# Patient Record
Sex: FEMALE | Race: BLACK OR AFRICAN AMERICAN
[De-identification: names, ages, dates, MRNs, and addresses within clinical notes are randomized per-mention and may not be internally consistent; named-entity substitution may affect disease eponyms.]

---

## 2017-01-11 ENCOUNTER — HOSPITAL ENCOUNTER (EMERGENCY)
Dept: HOSPITAL 35 - ER | Age: 72
Discharge: HOME | End: 2017-01-11
Payer: COMMERCIAL

## 2017-01-11 VITALS — DIASTOLIC BLOOD PRESSURE: 74 MMHG | SYSTOLIC BLOOD PRESSURE: 135 MMHG

## 2017-01-11 VITALS — BODY MASS INDEX: 30.76 KG/M2 | HEIGHT: 67 IN | WEIGHT: 196.01 LBS

## 2017-01-11 DIAGNOSIS — Z88.1: ICD-10-CM

## 2017-01-11 DIAGNOSIS — I10: ICD-10-CM

## 2017-01-11 DIAGNOSIS — Z88.8: ICD-10-CM

## 2017-01-11 DIAGNOSIS — E11.9: ICD-10-CM

## 2017-01-11 DIAGNOSIS — B34.9: Primary | ICD-10-CM

## 2017-01-11 LAB
ALBUMIN SERPL-MCNC: 3.7 G/DL (ref 3.4–5)
ALP SERPL-CCNC: 172 U/L (ref 46–116)
ALT SERPL-CCNC: 36 U/L (ref 30–65)
ANION GAP SERPL CALC-SCNC: 9 MMOL/L (ref 7–16)
AST SERPL-CCNC: 18 U/L (ref 15–37)
BASOPHILS NFR BLD AUTO: 0.9 % (ref 0–2)
BILIRUB SERPL-MCNC: 1.1 MG/DL
BILIRUB UR-MCNC: NEGATIVE MG/DL
BUN SERPL-MCNC: 13 MG/DL (ref 7–18)
CALCIUM SERPL-MCNC: 8.9 MG/DL (ref 8.5–10.1)
CHLORIDE SERPL-SCNC: 101 MMOL/L (ref 98–107)
CO2 SERPL-SCNC: 29 MMOL/L (ref 21–32)
COLOR UR: YELLOW
CREAT SERPL-MCNC: 1.2 MG/DL (ref 0.6–1.3)
EOSINOPHIL NFR BLD: 1.7 % (ref 0–3)
ERYTHROCYTE [DISTWIDTH] IN BLOOD BY AUTOMATED COUNT: 14.8 % (ref 10.5–14.5)
GLUCOSE SERPL-MCNC: 114 MG/DL (ref 70–99)
GRANULOCYTES NFR BLD MANUAL: 75.8 % (ref 36–66)
HCT VFR BLD CALC: 32.2 % (ref 37–47)
HGB BLD-MCNC: 10.7 GM/DL (ref 12–15)
KETONES UR STRIP-MCNC: NEGATIVE MG/DL
LYMPHOCYTES NFR BLD AUTO: 14.5 % (ref 24–44)
MANUAL DIFFERENTIAL PERFORMED BLD QL: NO
MCH RBC QN AUTO: 25.9 PG (ref 26–34)
MCHC RBC AUTO-ENTMCNC: 33.3 % (ref 28–37)
MCV RBC: 77.8 FL (ref 80–100)
MONOCYTES NFR BLD: 7.1 % (ref 1–8)
NEUTROPHILS # BLD: 5.3 THOU/UL (ref 1.4–8.2)
PLATELET # BLD: 213 THOU/UL (ref 150–400)
POTASSIUM SERPL-SCNC: 3.5 MMOL/L (ref 3.5–5.1)
PROT SERPL-MCNC: 7.8 G/DL (ref 6.4–8.2)
RBC # BLD AUTO: 4.14 MIL/UL (ref 4.2–5)
RBC # UR STRIP: NEGATIVE /UL
SODIUM SERPL-SCNC: 139 MMOL/L (ref 136–145)
SP GR UR STRIP: <= 1.005 (ref 1–1.03)
URINE GLUCOSE-RANDOM*: NEGATIVE
URINE LEUKOCYTES-REFLEX: (no result)
URINE PROTEIN (DIPSTICK): NEGATIVE
URINE WBC-REFLEX: (no result) /HPF (ref 0–5)
UROBILINOGEN UR STRIP-ACNC: 0.2 E.U./DL (ref 0.2–1)
WBC # BLD AUTO: 7 THOU/UL (ref 4–11)

## 2017-07-28 ENCOUNTER — HOSPITAL ENCOUNTER (EMERGENCY)
Dept: HOSPITAL 35 - ER | Age: 72
Discharge: HOME | End: 2017-07-28
Payer: COMMERCIAL

## 2017-07-28 VITALS — SYSTOLIC BLOOD PRESSURE: 164 MMHG | DIASTOLIC BLOOD PRESSURE: 74 MMHG

## 2017-07-28 VITALS — WEIGHT: 189 LBS | BODY MASS INDEX: 29.66 KG/M2 | HEIGHT: 67 IN

## 2017-07-28 DIAGNOSIS — D86.9: ICD-10-CM

## 2017-07-28 DIAGNOSIS — Z88.8: ICD-10-CM

## 2017-07-28 DIAGNOSIS — I10: ICD-10-CM

## 2017-07-28 DIAGNOSIS — K21.9: Primary | ICD-10-CM

## 2017-07-28 DIAGNOSIS — E11.9: ICD-10-CM

## 2017-07-28 DIAGNOSIS — Z88.1: ICD-10-CM

## 2017-07-28 DIAGNOSIS — Z79.4: ICD-10-CM

## 2017-07-28 LAB
ANION GAP SERPL CALC-SCNC: 10 MMOL/L (ref 7–16)
BASOPHILS NFR BLD AUTO: 0.6 % (ref 0–2)
BUN SERPL-MCNC: 13 MG/DL (ref 7–18)
CALCIUM SERPL-MCNC: 9 MG/DL (ref 8.5–10.1)
CHLORIDE SERPL-SCNC: 107 MMOL/L (ref 98–107)
CO2 SERPL-SCNC: 28 MMOL/L (ref 21–32)
CREAT SERPL-MCNC: 1.3 MG/DL (ref 0.6–1)
EOSINOPHIL NFR BLD: 3.9 % (ref 0–3)
ERYTHROCYTE [DISTWIDTH] IN BLOOD BY AUTOMATED COUNT: 14.7 % (ref 10.5–14.5)
GLUCOSE SERPL-MCNC: 117 MG/DL (ref 74–106)
GRANULOCYTES NFR BLD MANUAL: 51.9 % (ref 36–66)
HCT VFR BLD CALC: 29.9 % (ref 37–47)
HGB BLD-MCNC: 10 GM/DL (ref 12–15)
LYMPHOCYTES NFR BLD AUTO: 35.8 % (ref 24–44)
MANUAL DIFFERENTIAL PERFORMED BLD QL: NO
MCH RBC QN AUTO: 26.6 PG (ref 26–34)
MCHC RBC AUTO-ENTMCNC: 33.6 G/DL (ref 28–37)
MCV RBC: 79.2 FL (ref 80–100)
MONOCYTES NFR BLD: 7.8 % (ref 1–8)
NEUTROPHILS # BLD: 3.2 THOU/UL (ref 1.4–8.2)
PLATELET # BLD: 243 THOU/UL (ref 150–400)
POTASSIUM SERPL-SCNC: 3.5 MMOL/L (ref 3.5–5.1)
RBC # BLD AUTO: 3.77 MIL/UL (ref 4.2–5)
SODIUM SERPL-SCNC: 145 MMOL/L (ref 136–145)
TROPONIN I SERPL-MCNC: < 0.04 NG/ML
WBC # BLD AUTO: 6.1 THOU/UL (ref 4–11)

## 2018-01-25 ENCOUNTER — HOSPITAL ENCOUNTER (EMERGENCY)
Dept: HOSPITAL 35 - ER | Age: 73
LOS: 1 days | Discharge: HOME | End: 2018-01-26
Payer: COMMERCIAL

## 2018-01-25 VITALS — WEIGHT: 189.99 LBS | BODY MASS INDEX: 29.82 KG/M2 | HEIGHT: 67 IN

## 2018-01-25 DIAGNOSIS — I10: ICD-10-CM

## 2018-01-25 DIAGNOSIS — Z88.1: ICD-10-CM

## 2018-01-25 DIAGNOSIS — J11.1: Primary | ICD-10-CM

## 2018-01-25 DIAGNOSIS — E11.9: ICD-10-CM

## 2018-01-25 LAB
ANION GAP SERPL CALC-SCNC: 8 MMOL/L (ref 7–16)
BASOPHILS NFR BLD AUTO: 0.8 % (ref 0–2)
BUN SERPL-MCNC: 11 MG/DL (ref 7–18)
CALCIUM SERPL-MCNC: 8.6 MG/DL (ref 8.5–10.1)
CHLORIDE SERPL-SCNC: 103 MMOL/L (ref 98–107)
CO2 SERPL-SCNC: 28 MMOL/L (ref 21–32)
CREAT SERPL-MCNC: 1.2 MG/DL (ref 0.6–1)
EOSINOPHIL NFR BLD: 1 % (ref 0–3)
ERYTHROCYTE [DISTWIDTH] IN BLOOD BY AUTOMATED COUNT: 15.1 % (ref 10.5–14.5)
GLUCOSE SERPL-MCNC: 123 MG/DL (ref 74–106)
GRANULOCYTES NFR BLD MANUAL: 78 % (ref 36–66)
HCT VFR BLD CALC: 31.2 % (ref 37–47)
HGB BLD-MCNC: 10.4 GM/DL (ref 12–15)
LYMPHOCYTES NFR BLD AUTO: 13.4 % (ref 24–44)
MCH RBC QN AUTO: 26.2 PG (ref 26–34)
MCHC RBC AUTO-ENTMCNC: 33.4 G/DL (ref 28–37)
MCV RBC: 78.6 FL (ref 80–100)
MONOCYTES NFR BLD: 6.8 % (ref 1–8)
NEUTROPHILS # BLD: 8.4 THOU/UL (ref 1.4–8.2)
PLATELET # BLD: 222 THOU/UL (ref 150–400)
POTASSIUM SERPL-SCNC: 3.5 MMOL/L (ref 3.5–5.1)
RBC # BLD AUTO: 3.97 MIL/UL (ref 4.2–5)
SODIUM SERPL-SCNC: 139 MMOL/L (ref 136–145)
WBC # BLD AUTO: 10.8 THOU/UL (ref 4–11)

## 2018-01-26 VITALS — DIASTOLIC BLOOD PRESSURE: 67 MMHG | SYSTOLIC BLOOD PRESSURE: 146 MMHG

## 2018-05-13 ENCOUNTER — HOSPITAL ENCOUNTER (EMERGENCY)
Dept: HOSPITAL 35 - ER | Age: 73
Discharge: HOME | End: 2018-05-13
Payer: COMMERCIAL

## 2018-05-13 VITALS — DIASTOLIC BLOOD PRESSURE: 60 MMHG | SYSTOLIC BLOOD PRESSURE: 161 MMHG

## 2018-05-13 VITALS — WEIGHT: 187 LBS | HEIGHT: 67 IN | BODY MASS INDEX: 29.35 KG/M2

## 2018-05-13 DIAGNOSIS — Z79.4: ICD-10-CM

## 2018-05-13 DIAGNOSIS — E11.9: ICD-10-CM

## 2018-05-13 DIAGNOSIS — Z88.8: ICD-10-CM

## 2018-05-13 DIAGNOSIS — I10: ICD-10-CM

## 2018-05-13 DIAGNOSIS — D86.9: ICD-10-CM

## 2018-05-13 DIAGNOSIS — J02.8: Primary | ICD-10-CM

## 2018-05-13 DIAGNOSIS — Z88.1: ICD-10-CM

## 2018-05-20 ENCOUNTER — HOSPITAL ENCOUNTER (EMERGENCY)
Dept: HOSPITAL 35 - ER | Age: 73
Discharge: HOME | End: 2018-05-20
Payer: COMMERCIAL

## 2018-05-20 VITALS — DIASTOLIC BLOOD PRESSURE: 80 MMHG | SYSTOLIC BLOOD PRESSURE: 152 MMHG

## 2018-05-20 VITALS — HEIGHT: 68 IN | WEIGHT: 196.01 LBS | BODY MASS INDEX: 29.71 KG/M2

## 2018-05-20 DIAGNOSIS — I10: ICD-10-CM

## 2018-05-20 DIAGNOSIS — Z88.8: ICD-10-CM

## 2018-05-20 DIAGNOSIS — B34.9: Primary | ICD-10-CM

## 2018-05-20 DIAGNOSIS — Z88.1: ICD-10-CM

## 2018-05-20 DIAGNOSIS — E11.9: ICD-10-CM

## 2018-05-20 LAB
ANION GAP SERPL CALC-SCNC: 7 MMOL/L (ref 7–16)
ANISOCYTOSIS BLD QL SMEAR: SLIGHT
BUN SERPL-MCNC: 16 MG/DL (ref 7–18)
CALCIUM SERPL-MCNC: 9 MG/DL (ref 8.5–10.1)
CHLORIDE SERPL-SCNC: 103 MMOL/L (ref 98–107)
CO2 SERPL-SCNC: 27 MMOL/L (ref 21–32)
CREAT SERPL-MCNC: 1.2 MG/DL (ref 0.6–1)
EOSINOPHIL NFR BLD: 5 % (ref 0–3)
ERYTHROCYTE [DISTWIDTH] IN BLOOD BY AUTOMATED COUNT: 15.5 % (ref 10.5–14.5)
GLUCOSE SERPL-MCNC: 110 MG/DL (ref 74–106)
GRANULOCYTES NFR BLD MANUAL: 73 % (ref 36–66)
HCT VFR BLD CALC: 29.8 % (ref 37–47)
HGB BLD-MCNC: 9.9 GM/DL (ref 12–15)
LYMPHOCYTES NFR BLD AUTO: 17 % (ref 24–44)
MCH RBC QN AUTO: 25.7 PG (ref 26–34)
MCHC RBC AUTO-ENTMCNC: 33.1 G/DL (ref 28–37)
MCV RBC: 77.7 FL (ref 80–100)
MONOCYTES NFR BLD: 3 % (ref 1–8)
NEUTROPHILS # BLD: 9 THOU/UL (ref 1.4–8.2)
NEUTS BAND NFR BLD: 2 % (ref 0–8)
PLATELET # BLD: 262 THOU/UL (ref 150–400)
POTASSIUM SERPL-SCNC: 4 MMOL/L (ref 3.5–5.1)
RBC # BLD AUTO: 3.83 MIL/UL (ref 4.2–5)
SODIUM SERPL-SCNC: 137 MMOL/L (ref 136–145)
TROPONIN I SERPL-MCNC: < 0.04 NG/ML (ref ?–0.06)
WBC # BLD AUTO: 12 THOU/UL (ref 4–11)

## 2018-06-27 ENCOUNTER — HOSPITAL ENCOUNTER (EMERGENCY)
Dept: HOSPITAL 35 - ER | Age: 73
Discharge: HOME | End: 2018-06-27
Payer: COMMERCIAL

## 2018-06-27 VITALS — SYSTOLIC BLOOD PRESSURE: 166 MMHG | DIASTOLIC BLOOD PRESSURE: 78 MMHG

## 2018-06-27 VITALS — BODY MASS INDEX: 29.04 KG/M2 | HEIGHT: 67 IN | WEIGHT: 185.01 LBS
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
## 2018-06-27 DIAGNOSIS — E11.9: ICD-10-CM
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
## 2018-06-27 DIAGNOSIS — Z88.1: ICD-10-CM
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
## 2018-06-27 DIAGNOSIS — I10: ICD-10-CM

## 2018-06-27 DIAGNOSIS — S80.02XA: Primary | ICD-10-CM

## 2019-02-17 ENCOUNTER — HOSPITAL ENCOUNTER (EMERGENCY)
Dept: HOSPITAL 35 - ER | Age: 74
Discharge: HOME | End: 2019-02-17
Payer: COMMERCIAL

## 2019-02-17 VITALS — SYSTOLIC BLOOD PRESSURE: 162 MMHG | DIASTOLIC BLOOD PRESSURE: 76 MMHG

## 2019-02-17 VITALS — BODY MASS INDEX: 29.73 KG/M2 | HEIGHT: 66 IN | WEIGHT: 185.01 LBS

## 2019-02-17 DIAGNOSIS — Z88.8: ICD-10-CM

## 2019-02-17 DIAGNOSIS — D86.9: ICD-10-CM

## 2019-02-17 DIAGNOSIS — N18.9: ICD-10-CM

## 2019-02-17 DIAGNOSIS — I12.9: ICD-10-CM

## 2019-02-17 DIAGNOSIS — E11.22: ICD-10-CM

## 2019-02-17 DIAGNOSIS — Z88.1: ICD-10-CM

## 2019-02-17 DIAGNOSIS — F41.9: ICD-10-CM

## 2019-02-17 DIAGNOSIS — M25.562: ICD-10-CM

## 2019-02-17 DIAGNOSIS — Z79.4: ICD-10-CM

## 2019-02-17 DIAGNOSIS — R55: Primary | ICD-10-CM

## 2019-02-17 LAB
ALBUMIN SERPL-MCNC: 3.7 G/DL (ref 3.4–5)
ALT SERPL-CCNC: 24 U/L (ref 30–65)
ANION GAP SERPL CALC-SCNC: 9 MMOL/L (ref 7–16)
AST SERPL-CCNC: 18 U/L (ref 15–37)
BACTERIA #/AREA URNS HPF: (no result) /HPF
BASOPHILS NFR BLD AUTO: 0.9 % (ref 0–2)
BILIRUB SERPL-MCNC: 0.5 MG/DL
BILIRUB UR-MCNC: NEGATIVE MG/DL
BUN SERPL-MCNC: 15 MG/DL (ref 7–18)
CALCIUM SERPL-MCNC: 9.3 MG/DL (ref 8.5–10.1)
CHLORIDE SERPL-SCNC: 105 MMOL/L (ref 98–107)
CO2 SERPL-SCNC: 28 MMOL/L (ref 21–32)
COLOR UR: YELLOW
CREAT SERPL-MCNC: 1.6 MG/DL (ref 0.6–1)
EOSINOPHIL NFR BLD: 4.7 % (ref 0–3)
ERYTHROCYTE [DISTWIDTH] IN BLOOD BY AUTOMATED COUNT: 15.1 % (ref 10.5–14.5)
GLUCOSE SERPL-MCNC: 153 MG/DL (ref 74–106)
GRANULOCYTES NFR BLD MANUAL: 51.3 % (ref 36–66)
HCT VFR BLD CALC: 33.3 % (ref 37–47)
HGB BLD-MCNC: 11.1 GM/DL (ref 12–15)
KETONES UR STRIP-MCNC: NEGATIVE MG/DL
LYMPHOCYTES NFR BLD AUTO: 37 % (ref 24–44)
MAGNESIUM SERPL-MCNC: 1.9 MG/DL (ref 1.8–2.4)
MCH RBC QN AUTO: 26.4 PG (ref 26–34)
MCHC RBC AUTO-ENTMCNC: 33.4 G/DL (ref 28–37)
MCV RBC: 79.2 FL (ref 80–100)
MONOCYTES NFR BLD: 6.1 % (ref 1–8)
NEUTROPHILS # BLD: 3.2 THOU/UL (ref 1.4–8.2)
NITRITE UR QL STRIP: NEGATIVE
PLATELET # BLD: 255 THOU/UL (ref 150–400)
POTASSIUM SERPL-SCNC: 3.5 MMOL/L (ref 3.5–5.1)
PROT SERPL-MCNC: 7.8 G/DL (ref 6.4–8.2)
RBC # BLD AUTO: 4.2 MIL/UL (ref 4.2–5)
RBC # UR STRIP: NEGATIVE /UL
SODIUM SERPL-SCNC: 142 MMOL/L (ref 136–145)
SP GR UR STRIP: 1.01 (ref 1–1.03)
SQUAMOUS: (no result) /LPF (ref 0–3)
TROPONIN I SERPL-MCNC: <0.06 NG/ML (ref ?–0.06)
URINE CLARITY: CLEAR
URINE GLUCOSE-RANDOM*: NEGATIVE
URINE LEUKOCYTES: (no result)
URINE PROTEIN (DIPSTICK): NEGATIVE
UROBILINOGEN UR STRIP-ACNC: 0.2 E.U./DL (ref 0.2–1)
WBC # BLD AUTO: 6.3 THOU/UL (ref 4–11)
WBC #/AREA URNS HPF: (no result) /HPF (ref 0–5)

## 2019-03-03 ENCOUNTER — HOSPITAL ENCOUNTER (EMERGENCY)
Dept: HOSPITAL 35 - ER | Age: 74
Discharge: HOME | End: 2019-03-03
Payer: COMMERCIAL

## 2019-03-03 VITALS — BODY MASS INDEX: 30.76 KG/M2 | WEIGHT: 196.01 LBS | HEIGHT: 67 IN

## 2019-03-03 VITALS — SYSTOLIC BLOOD PRESSURE: 187 MMHG | DIASTOLIC BLOOD PRESSURE: 79 MMHG

## 2019-03-03 DIAGNOSIS — R51: Primary | ICD-10-CM

## 2019-03-03 DIAGNOSIS — I10: ICD-10-CM

## 2019-03-03 DIAGNOSIS — Z88.1: ICD-10-CM

## 2019-03-03 DIAGNOSIS — E11.9: ICD-10-CM

## 2019-03-03 DIAGNOSIS — Z88.8: ICD-10-CM

## 2019-07-25 ENCOUNTER — HOSPITAL ENCOUNTER (EMERGENCY)
Dept: HOSPITAL 35 - ER | Age: 74
Discharge: HOME | End: 2019-07-25
Payer: COMMERCIAL

## 2019-07-25 VITALS — BODY MASS INDEX: 31.24 KG/M2 | HEIGHT: 67 IN | WEIGHT: 199.01 LBS

## 2019-07-25 VITALS — DIASTOLIC BLOOD PRESSURE: 71 MMHG | SYSTOLIC BLOOD PRESSURE: 156 MMHG

## 2019-07-25 DIAGNOSIS — R09.81: ICD-10-CM

## 2019-07-25 DIAGNOSIS — I10: ICD-10-CM

## 2019-07-25 DIAGNOSIS — E11.9: ICD-10-CM

## 2019-07-25 DIAGNOSIS — Z79.4: ICD-10-CM

## 2019-07-25 DIAGNOSIS — M27.0: Primary | ICD-10-CM

## 2019-07-25 DIAGNOSIS — Z88.1: ICD-10-CM

## 2019-07-25 DIAGNOSIS — Z88.8: ICD-10-CM

## 2020-08-13 ENCOUNTER — HOSPITAL ENCOUNTER (EMERGENCY)
Dept: HOSPITAL 35 - ER | Age: 75
Discharge: HOME | End: 2020-08-13
Payer: COMMERCIAL

## 2020-08-13 VITALS — DIASTOLIC BLOOD PRESSURE: 68 MMHG | SYSTOLIC BLOOD PRESSURE: 148 MMHG

## 2020-08-13 VITALS — WEIGHT: 178.99 LBS | BODY MASS INDEX: 28.09 KG/M2 | HEIGHT: 67 IN

## 2020-08-13 DIAGNOSIS — E11.9: ICD-10-CM

## 2020-08-13 DIAGNOSIS — R07.9: Primary | ICD-10-CM

## 2020-08-13 DIAGNOSIS — I10: ICD-10-CM

## 2020-08-13 DIAGNOSIS — Z79.899: ICD-10-CM

## 2020-08-13 DIAGNOSIS — R53.83: ICD-10-CM

## 2020-08-13 DIAGNOSIS — Z79.4: ICD-10-CM

## 2020-08-13 DIAGNOSIS — Z98.890: ICD-10-CM

## 2020-08-13 DIAGNOSIS — Z88.1: ICD-10-CM

## 2020-08-13 LAB
ANION GAP SERPL CALC-SCNC: 10 MMOL/L (ref 7–16)
ANISOCYTOSIS BLD QL SMEAR: SLIGHT
BASOPHILS NFR BLD AUTO: 0 % (ref 0–2)
BUN SERPL-MCNC: 21 MG/DL (ref 7–18)
CALCIUM SERPL-MCNC: 8.6 MG/DL (ref 8.5–10.1)
CHLORIDE SERPL-SCNC: 106 MMOL/L (ref 98–107)
CO2 SERPL-SCNC: 27 MMOL/L (ref 21–32)
CREAT SERPL-MCNC: 1.3 MG/DL (ref 0.6–1)
EOSINOPHIL NFR BLD: 2 % (ref 0–3)
ERYTHROCYTE [DISTWIDTH] IN BLOOD BY AUTOMATED COUNT: 15.6 % (ref 10.5–14.5)
GLUCOSE SERPL-MCNC: 95 MG/DL (ref 74–106)
GRANULOCYTES NFR BLD MANUAL: 50 % (ref 36–66)
HCT VFR BLD CALC: 32.5 % (ref 37–47)
HGB BLD-MCNC: 10.5 GM/DL (ref 12–15)
LYMPHOCYTES NFR BLD AUTO: 42 % (ref 24–44)
MCH RBC QN AUTO: 25.9 PG (ref 26–34)
MCHC RBC AUTO-ENTMCNC: 32.5 G/DL (ref 28–37)
MCV RBC: 79.9 FL (ref 80–100)
MONOCYTES NFR BLD: 6 % (ref 1–8)
NEUTROPHILS # BLD: 3.1 THOU/UL (ref 1.4–8.2)
NEUTS BAND NFR BLD: 0 % (ref 0–8)
PLATELET # BLD: 308 THOU/UL (ref 150–400)
POTASSIUM SERPL-SCNC: 3.8 MMOL/L (ref 3.5–5.1)
RBC # BLD AUTO: 4.06 MIL/UL (ref 4.2–5)
SODIUM SERPL-SCNC: 143 MMOL/L (ref 136–145)
TROPONIN I SERPL-MCNC: <0.06 NG/ML (ref ?–0.06)
WBC # BLD AUTO: 6.2 THOU/UL (ref 4–11)

## 2020-08-13 NOTE — EKG
[facility]
[place]   [address]                     ELECTROCARDIOGRAM REPORT      
_______________________________________________________________________________
 
Name:       DEEPTHI JEWELL               Room #:                     DEP [facility]#:      [id number]                       Account #:      [id number]  
Admission:  20    Attend Phys:                          
Discharge:  20    Date of Birth:  [birth year]  
                                                          Report #: [id number]
                                                                    [id number]
_______________________________________________________________________________
THIS REPORT FOR:  
 
cc:  Jeanine Tam MD, Stany A. MD Lundgren,Kain SANTIAGO MD [facility]                                        ~
THIS REPORT FOR:   //name//                          
 
                         [facility] ED
                                       
Test Date:    2020               Test Time:    06:18:16
Pat Name:     DEEPTHI JEWELL            Department:   
Patient ID:   [id number]            Room:          
Gender:       F                        Technician:   NOE
:          1945               Requested By: Konstantin Mccartney
Order Number: [id number] MD:   Kain Carballo
                                 Measurements
Intervals                              Axis          
Rate:         59                       P:            -45
HI:           235                      QRS:          -41
QRSD:         125                      T:            1
QT:           424                                    
QTc:          420                                    
                           Interpretive Statements
Sinus bradycardia
Prolonged HI interval
Nonspecific IVCD with LAD
Left ventricular hypertrophy
Baseline wander in lead(s) I,II,aVR
Compared to ECG 2019 15:27:21
No significant change was found
Electronically Signed On 2020 9:12:33 CDT by Kain Carballo
[email or web address]
 
 
 
 
 
 
 
 
 
 
 
 
  <ELECTRONICALLY SIGNED>
   By: Kain Carballo MD, [facility]   
  20
D: 20                           _____________________________________
T: 20                           Kain Carballo MD, FAC     /EPI

## 2021-01-11 ENCOUNTER — HOSPITAL ENCOUNTER (EMERGENCY)
Dept: HOSPITAL 35 - ER | Age: 76
Discharge: HOME | End: 2021-01-11
Payer: COMMERCIAL

## 2021-01-11 VITALS — DIASTOLIC BLOOD PRESSURE: 75 MMHG | SYSTOLIC BLOOD PRESSURE: 124 MMHG

## 2021-01-11 VITALS — BODY MASS INDEX: 27.78 KG/M2 | HEIGHT: 67 IN | WEIGHT: 177.01 LBS

## 2021-01-11 DIAGNOSIS — E11.9: ICD-10-CM

## 2021-01-11 DIAGNOSIS — Z79.899: ICD-10-CM

## 2021-01-11 DIAGNOSIS — Z88.1: ICD-10-CM

## 2021-01-11 DIAGNOSIS — Z88.8: ICD-10-CM

## 2021-01-11 DIAGNOSIS — Z98.890: ICD-10-CM

## 2021-01-11 DIAGNOSIS — Z79.4: ICD-10-CM

## 2021-01-11 DIAGNOSIS — U07.1: Primary | ICD-10-CM

## 2021-01-11 DIAGNOSIS — I10: ICD-10-CM

## 2021-01-11 LAB
ALBUMIN SERPL-MCNC: 3.3 G/DL (ref 3.4–5)
ALT SERPL-CCNC: 27 U/L (ref 30–65)
ANION GAP SERPL CALC-SCNC: 12 MMOL/L (ref 7–16)
APTT BLD: 25.5 SECONDS (ref 24.5–32.8)
AST SERPL-CCNC: 23 U/L (ref 15–37)
BACTERIA-REFLEX: (no result) /HPF
BASOPHILS NFR BLD AUTO: 1.3 % (ref 0–2)
BILIRUB SERPL-MCNC: 0.6 MG/DL (ref 0.2–1)
BILIRUB UR-MCNC: NEGATIVE MG/DL
BUN SERPL-MCNC: 10 MG/DL (ref 7–18)
CALCIUM SERPL-MCNC: 8.8 MG/DL (ref 8.5–10.1)
CHLORIDE SERPL-SCNC: 104 MMOL/L (ref 98–107)
CO2 SERPL-SCNC: 26 MMOL/L (ref 21–32)
COLOR UR: YELLOW
CREAT SERPL-MCNC: 1.2 MG/DL (ref 0.6–1)
EOSINOPHIL NFR BLD: 1 % (ref 0–3)
ERYTHROCYTE [DISTWIDTH] IN BLOOD BY AUTOMATED COUNT: 15.2 % (ref 10.5–14.5)
GLUCOSE SERPL-MCNC: 109 MG/DL (ref 74–106)
GRANULOCYTES NFR BLD MANUAL: 60.5 % (ref 36–66)
HCT VFR BLD CALC: 31.7 % (ref 37–47)
HGB BLD-MCNC: 10.1 GM/DL (ref 12–15)
INR PPP: 1
KETONES UR STRIP-MCNC: NEGATIVE MG/DL
LYMPHOCYTES NFR BLD AUTO: 27.6 % (ref 24–44)
MCH RBC QN AUTO: 25.8 PG (ref 26–34)
MCHC RBC AUTO-ENTMCNC: 31.9 G/DL (ref 28–37)
MCV RBC: 80.9 FL (ref 80–100)
MONOCYTES NFR BLD: 9.6 % (ref 1–8)
NEUTROPHILS # BLD: 2.3 THOU/UL (ref 1.4–8.2)
PLATELET # BLD: 198 THOU/UL (ref 150–400)
POTASSIUM SERPL-SCNC: 3.6 MMOL/L (ref 3.5–5.1)
PROT SERPL-MCNC: 7.4 G/DL (ref 6.4–8.2)
PROTHROMBIN TIME: 10.6 SECONDS (ref 9.3–11.4)
RBC # BLD AUTO: 3.92 MIL/UL (ref 4.2–5)
RBC # UR STRIP: NEGATIVE /UL
SODIUM SERPL-SCNC: 142 MMOL/L (ref 136–145)
SP GR UR STRIP: 1.01 (ref 1–1.03)
SQUAMOUS: (no result) /LPF (ref 0–3)
TROPONIN I SERPL-MCNC: <0.06 NG/ML (ref ?–0.06)
URINE CLARITY: CLEAR
URINE GLUCOSE-RANDOM*: NEGATIVE
URINE LEUKOCYTES-REFLEX: NEGATIVE
URINE NITRITE-REFLEX: NEGATIVE
URINE PROTEIN (DIPSTICK): (no result)
UROBILINOGEN UR STRIP-ACNC: 0.2 E.U./DL (ref 0.2–1)
WBC # BLD AUTO: 3.7 THOU/UL (ref 4–11)

## 2021-01-11 NOTE — EKG
[address] [address] [facility] [address]
[place]  [address]
Phone:  [phone]                    ELECTROCARDIOGRAM REPORT      
_______________________________________________________________________________
 
Name:       DEEPTHI JEWELL               Room #:                     PRE ER  
M.R.#:      [id number]     Account #:      [id number]  
Admission:              Attend Phys:                          
Discharge:              Date of Birth:  [birth year]  
                                                          Report #: [id number]
   [id number]
_______________________________________________________________________________
                         [facility] ED
                                       
Test Date:    2021               Test Time:    16:02:11
Pat Name:     DEEPTHI JEWELL            Department:   
Patient ID:   [id number]            Room:          
Gender:       F                        Technician:   ARCHANA
:          1945               Requested By: Bright Cook
Order Number: [id number] MD:   Kain Carballo
                                 Measurements
Intervals                              Axis          
Rate:         80                       P:            -45
ND:           205                      QRS:          -30
QRSD:         118                      T:            49
QT:           402                                    
QTc:          464                                    
                           Interpretive Statements
Sinus rhythm with first-degree AV block
LAFB
Nonspecific intraventricular conduction delay
Nonspecific ST segment abnormality
Compared to ECG 2020 06:18:16
No significant change was found
Electronically Signed On 2021 16:22:02 CST by Kain Carballo
[email or web address]
 
 
 
 
 
 
 
 
 
 
 
 
 
 
 
 
 
 
 
  <ELECTRONICALLY SIGNED>
   By: Kain Carballo MD, [facility]   
  21     1622
D: 21 1602                           _____________________________________
T: 21 1602                           Kain Carballo MD, FACC     /EPI

## 2021-01-13 ENCOUNTER — HOSPITAL ENCOUNTER (EMERGENCY)
Dept: HOSPITAL 35 - ER | Age: 76
Discharge: HOME | End: 2021-01-13
Payer: COMMERCIAL

## 2021-01-13 VITALS — SYSTOLIC BLOOD PRESSURE: 148 MMHG | DIASTOLIC BLOOD PRESSURE: 65 MMHG

## 2021-01-13 VITALS — HEIGHT: 67 IN | WEIGHT: 177.01 LBS | BODY MASS INDEX: 27.78 KG/M2

## 2021-01-13 DIAGNOSIS — Z98.890: ICD-10-CM

## 2021-01-13 DIAGNOSIS — Z88.8: ICD-10-CM

## 2021-01-13 DIAGNOSIS — I10: ICD-10-CM

## 2021-01-13 DIAGNOSIS — Z79.4: ICD-10-CM

## 2021-01-13 DIAGNOSIS — E11.9: ICD-10-CM

## 2021-01-13 DIAGNOSIS — Z88.1: ICD-10-CM

## 2021-01-13 DIAGNOSIS — Z79.899: ICD-10-CM

## 2021-01-13 DIAGNOSIS — R10.31: Primary | ICD-10-CM

## 2021-01-13 LAB
ALBUMIN SERPL-MCNC: 3.2 G/DL (ref 3.4–5)
ALT SERPL-CCNC: 25 U/L (ref 14–59)
ANION GAP SERPL CALC-SCNC: 12 MMOL/L (ref 7–16)
AST SERPL-CCNC: 24 U/L (ref 15–37)
BASOPHILS NFR BLD AUTO: 0.7 % (ref 0–2)
BILIRUB SERPL-MCNC: 0.6 MG/DL (ref 0.2–1)
BILIRUB UR-MCNC: NEGATIVE MG/DL
BUN SERPL-MCNC: 10 MG/DL (ref 7–18)
CALCIUM SERPL-MCNC: 8.9 MG/DL (ref 8.5–10.1)
CHLORIDE SERPL-SCNC: 103 MMOL/L (ref 98–107)
CO2 SERPL-SCNC: 24 MMOL/L (ref 21–32)
COLOR UR: YELLOW
CREAT SERPL-MCNC: 1.2 MG/DL (ref 0.6–1)
EOSINOPHIL NFR BLD: 0.8 % (ref 0–3)
ERYTHROCYTE [DISTWIDTH] IN BLOOD BY AUTOMATED COUNT: 14.9 % (ref 10.5–14.5)
GLUCOSE SERPL-MCNC: 117 MG/DL (ref 74–106)
GRANULOCYTES NFR BLD MANUAL: 69.5 % (ref 36–66)
HCT VFR BLD CALC: 31.1 % (ref 37–47)
HGB BLD-MCNC: 9.9 GM/DL (ref 12–15)
KETONES UR STRIP-MCNC: (no result) MG/DL
LIPASE: 107 U/L (ref 73–393)
LYMPHOCYTES NFR BLD AUTO: 22.4 % (ref 24–44)
MCH RBC QN AUTO: 25.7 PG (ref 26–34)
MCHC RBC AUTO-ENTMCNC: 31.9 G/DL (ref 28–37)
MCV RBC: 80.7 FL (ref 80–100)
MONOCYTES NFR BLD: 6.6 % (ref 1–8)
NEUTROPHILS # BLD: 3.8 THOU/UL (ref 1.4–8.2)
PLATELET # BLD: 212 THOU/UL (ref 150–400)
POTASSIUM SERPL-SCNC: 3.4 MMOL/L (ref 3.5–5.1)
PROT SERPL-MCNC: 7.4 G/DL (ref 6.4–8.2)
RBC # BLD AUTO: 3.86 MIL/UL (ref 4.2–5)
RBC # UR STRIP: NEGATIVE /UL
RBC #/AREA URNS HPF: (no result) /HPF (ref 0–2)
SODIUM SERPL-SCNC: 139 MMOL/L (ref 136–145)
SP GR UR STRIP: 1.01 (ref 1–1.03)
SQUAMOUS: (no result) /LPF (ref 0–3)
TROPONIN I SERPL-MCNC: <0.06 NG/ML (ref ?–0.06)
URINE CLARITY: CLEAR
URINE GLUCOSE-RANDOM*: NEGATIVE
URINE LEUKOCYTES-REFLEX: NEGATIVE
URINE NITRITE-REFLEX: NEGATIVE
URINE PROTEIN (DIPSTICK): (no result)
URINE WBC-REFLEX: (no result) /HPF (ref 0–5)
UROBILINOGEN UR STRIP-ACNC: 0.2 E.U./DL (ref 0.2–1)
WBC # BLD AUTO: 5.5 THOU/UL (ref 4–11)

## 2021-01-13 NOTE — EKG
[address] [place]  [address]
Phone:  [phone]                    ELECTROCARDIOGRAM REPORT      
_______________________________________________________________________________
 
Name:       FANTADAVIDDEEPTHI M               [id number]     Account #:      [id number]  
Admission:  21    Attend Phys:                          
Discharge:              Date of Birth:  [birth year]  
                                                          Report #: [id number]
   [id number]
_______________________________________________________________________________
                         [facility] ED
                                       
Test Date:    2021               Test Time:    04:43:33
Pat Name:     DEEPTHI JEWELL            Department:   
Patient ID:   [id number]            Room:          
Gender:       F                        Technician:   CHAD
:          1945               Requested By: Bright Cook
Order Number: [id number] MD:   Yared Streeter
                                 Measurements
Intervals                              Axis          
Rate:         72                       P:            -23
MI:           211                      QRS:          -53
QRSD:         124                      T:            4
QT:           412                                    
QTc:          451                                    
                           Interpretive Statements
Sinus rhythm
Nonspecific IVCD with LAD
Left ventricular hypertrophy
Compared to ECG 2021 16:02:11
Left ventricular hypertrophy now present
Left anterior fascicular block no longer present
ST (T wave) deviation no longer present
Electronically Signed On 2021 7:17:07 CST by Yared Streeter
[email or web address]
 
 
 
 
 
 
 
 
 
 
 
 
 
 
 
 
 
 
  <ELECTRONICALLY SIGNED>
   By: Yared Streeter MD, [facility]    
  21     07
D: 21                           _____________________________________
T: 21                           Yared Streeter MD, FAC      /EPI

## 2021-01-15 ENCOUNTER — HOSPITAL ENCOUNTER (INPATIENT)
Dept: HOSPITAL 35 - ER | Age: 76
LOS: 5 days | Discharge: HOME | DRG: 177 | End: 2021-01-20
Attending: INTERNAL MEDICINE | Admitting: INTERNAL MEDICINE
Payer: COMMERCIAL

## 2021-01-15 VITALS — BODY MASS INDEX: 28.09 KG/M2 | WEIGHT: 179 LBS | HEIGHT: 67.01 IN

## 2021-01-15 VITALS — DIASTOLIC BLOOD PRESSURE: 63 MMHG | SYSTOLIC BLOOD PRESSURE: 121 MMHG

## 2021-01-15 DIAGNOSIS — Z79.899: ICD-10-CM

## 2021-01-15 DIAGNOSIS — J38.2: ICD-10-CM

## 2021-01-15 DIAGNOSIS — D86.9: ICD-10-CM

## 2021-01-15 DIAGNOSIS — D63.8: ICD-10-CM

## 2021-01-15 DIAGNOSIS — F41.1: ICD-10-CM

## 2021-01-15 DIAGNOSIS — U07.1: Primary | ICD-10-CM

## 2021-01-15 DIAGNOSIS — Z88.8: ICD-10-CM

## 2021-01-15 DIAGNOSIS — J12.82: ICD-10-CM

## 2021-01-15 DIAGNOSIS — Z88.1: ICD-10-CM

## 2021-01-15 DIAGNOSIS — N17.0: ICD-10-CM

## 2021-01-15 DIAGNOSIS — J96.21: ICD-10-CM

## 2021-01-15 DIAGNOSIS — I10: ICD-10-CM

## 2021-01-15 DIAGNOSIS — E11.9: ICD-10-CM

## 2021-01-15 DIAGNOSIS — N28.9: ICD-10-CM

## 2021-01-15 PROCEDURE — 10879: CPT

## 2021-01-16 VITALS — DIASTOLIC BLOOD PRESSURE: 90 MMHG | SYSTOLIC BLOOD PRESSURE: 149 MMHG

## 2021-01-16 VITALS — SYSTOLIC BLOOD PRESSURE: 133 MMHG | DIASTOLIC BLOOD PRESSURE: 65 MMHG

## 2021-01-16 VITALS — DIASTOLIC BLOOD PRESSURE: 74 MMHG | SYSTOLIC BLOOD PRESSURE: 152 MMHG

## 2021-01-16 VITALS — SYSTOLIC BLOOD PRESSURE: 117 MMHG | DIASTOLIC BLOOD PRESSURE: 63 MMHG

## 2021-01-16 LAB
ALBUMIN SERPL-MCNC: 2.8 G/DL (ref 3.4–5)
ALT SERPL-CCNC: 28 U/L (ref 30–65)
ANION GAP SERPL CALC-SCNC: 13 MMOL/L (ref 7–16)
AST SERPL-CCNC: 33 U/L (ref 15–37)
BASOPHILS NFR BLD AUTO: 1 % (ref 0–2)
BILIRUB SERPL-MCNC: 0.7 MG/DL (ref 0.2–1)
BILIRUB UR-MCNC: NEGATIVE MG/DL
BUN SERPL-MCNC: 16 MG/DL (ref 7–18)
CALCIUM SERPL-MCNC: 8.4 MG/DL (ref 8.5–10.1)
CHLORIDE SERPL-SCNC: 104 MMOL/L (ref 98–107)
CO2 SERPL-SCNC: 24 MMOL/L (ref 21–32)
COLOR UR: YELLOW
CREAT SERPL-MCNC: 1.6 MG/DL (ref 0.6–1)
EOSINOPHIL NFR BLD: 0.9 % (ref 0–3)
ERYTHROCYTE [DISTWIDTH] IN BLOOD BY AUTOMATED COUNT: 14.6 % (ref 10.5–14.5)
GLUCOSE SERPL-MCNC: 104 MG/DL (ref 74–106)
GRANULOCYTES NFR BLD MANUAL: 69.2 % (ref 36–66)
HCT VFR BLD CALC: 28.3 % (ref 37–47)
HGB BLD-MCNC: 9 GM/DL (ref 12–15)
KETONES UR STRIP-MCNC: NEGATIVE MG/DL
LYMPHOCYTES NFR BLD AUTO: 19.2 % (ref 24–44)
MCH RBC QN AUTO: 25.5 PG (ref 26–34)
MCHC RBC AUTO-ENTMCNC: 31.9 G/DL (ref 28–37)
MCV RBC: 79.9 FL (ref 80–100)
MONOCYTES NFR BLD: 9.7 % (ref 1–8)
NEUTROPHILS # BLD: 4.1 THOU/UL (ref 1.4–8.2)
NITRITE UR QL STRIP: NEGATIVE
PLATELET # BLD: 261 THOU/UL (ref 150–400)
POTASSIUM SERPL-SCNC: 3.4 MMOL/L (ref 3.5–5.1)
PROT SERPL-MCNC: 7.1 G/DL (ref 6.4–8.2)
RBC # BLD AUTO: 3.54 MIL/UL (ref 4.2–5)
RBC # UR STRIP: (no result) /UL
SODIUM SERPL-SCNC: 141 MMOL/L (ref 136–145)
SP GR UR STRIP: <= 1.005 (ref 1–1.03)
URINE CLARITY: CLEAR
URINE GLUCOSE-RANDOM*: NEGATIVE
URINE LEUKOCYTES: NEGATIVE
URINE PROTEIN (DIPSTICK): NEGATIVE
UROBILINOGEN UR STRIP-ACNC: 0.2 E.U./DL (ref 0.2–1)
WBC # BLD AUTO: 5.9 THOU/UL (ref 4–11)

## 2021-01-16 PROCEDURE — XW033E5 INTRODUCTION OF REMDESIVIR ANTI-INFECTIVE INTO PERIPHERAL VEIN, PERCUTANEOUS APPROACH, NEW TECHNOLOGY GROUP 5: ICD-10-PCS | Performed by: INTERNAL MEDICINE

## 2021-01-16 NOTE — HC
[facility]
[address]
[place]   [address]                     CONSULTATION                  
_______________________________________________________________________________
 
Name:       DEEPTHI JEWELL               Room #:         170-6       ADM IN  
M.R.#:      [id number]                       Account #:      [id number]  
Admission:  01/16/21    Attend Phys:    Yang Messina MD      
Discharge:              Date of Birth:  [birth year]  
                                                          Report #: [id number]
                                                                    [id number]   
_______________________________________________________________________________
THIS REPORT FOR:  
 
cc:  Jeanine Tam MD, Stany A. MD Barry,Epifanio BRANTLEY MD                                           ~
 
DATE OF SERVICE:  01/16/2021
 
 
INFECTIOUS DISEASE CONSULTATION
 
ATTENDING PHYSICIAN:  Dr. Messina.
 
REASON FOR EVALUATION:  COVID-19 infection, complicated by pneumonitis and
respiratory failure.
 
HISTORY OF SUBJECTIVE:  Chart reviewed, patient examined.  This is a 75-year-old
woman with fairly extensive medical history including diabetes mellitus type 2,
also has sarcoidosis and hypertension, who presented to the Emergency Room with
complaints of dyspnea.  She did test positive for the coronavirus on 01/11/2021.
 However, she noted when checking her saturations, they had dropped into the low
80s.  Did admit to cough as well as.  Has poor p.o. intake.  She had some chills
and maybe some low-grade temperature elevations along with progressive weakness.
 She was evaluated in the Emergency Room.  Lactic acid was 1.2.  Blood cultures
collected at time of admission.  She was seen on the 11th, sterile thus far.
D-dimer was elevated at 2.16.  Did undergo a CT abdomen and pelvis previously. 
There is a question of a new small renal neoplasm without obstruction.  No
abscesses.  Chest x-ray showed mild bibasilar infiltrates.  CT chest PE protocol
showed no evidence of pulmonary embolus, did have bronchiectasis and patchy
consolidated as well.  Empirically started on azithromycin, ceftriaxone as well
as dexamethasone.
 
ALLERGIES:  LISTED TO LEVAQUIN, LISINOPRIL, WHICH CAUSES A COUGH.
 
CURRENT MEDICATIONS:  Include enoxaparin, multivitamin, amlodipine, famotidine,
zinc, ascorbic acid, dexamethasone, insulin lispro sliding scale, ceftriaxone
and azithromycin.
 
PAST MEDICAL HISTORY:  Hypertension, sarcoidosis, diabetes mellitus.
 
SOCIAL HISTORY:  Nonsmoker, no ethanol, no illicit drug use.
 
FAMILY HISTORY:  Noncontributory.
 
REVIEW OF SYSTEMS:  Otherwise, unremarkable 10-point review of systems.
 
 
 
 
[facility]
[address] [place]   [address]                     CONSULTATION                  
_______________________________________________________________________________
 
Name:       DEEPTHI JEWELL               Room #:         170-6       [facility] [place]  
[facility].#:      [id number]                       Account #:      [id number]  
Admission:  01/16/21    Attend Phys:    Yang Messina MD      
Discharge:              Date of Birth:  [birth year]  
                                                          Report #: [id number]
                                                                    [id number]   
_______________________________________________________________________________
 
PHYSICAL EXAMINATION:
GENERAL:  She is in mild-to-moderate distress.  She is generally lucid, appears
reasonably well nourished.
VITAL SIGNS:  Temperature 97.7, pulse 55, respirations 25, blood pressure
117/63.
SKIN:  Warm, dry, no rashes.
HEENT:  Nasal cannula in place.  Normocephalic.  Extraocular muscles intact.
NECK:  Supple.
LUNGS:  Few scattered coarse breath sounds.
HEART:  Borderline bradycardic, appears to be regular, may have a soft systolic
murmur.
ABDOMEN:  Mildly distended, soft.  There is some mild tenderness.
GENITOURINARY AND RECTAL:  Deferred.
 
LABORATORY DATA:  Procalcitonin 0.18.  CT of the chest and chest x-ray were as
noted above.  Urinalysis otherwise unremarkable.  CBC:  White count of 5.9, H
and H 9.0 and 28.3, platelets of 261.  Electrolytes:  Sodium 141, potassium 3.4,
chloride 104, bicarbonate 24, anion gap of 13, BUN and creatinine 16 and 1.6. 
LFTs unremarkable.  Albumin of 2.8, total protein 7.1.  D-dimer 2.16.
 
ASSESSMENT:  COVID-19 infection, complicated by pneumonitis and respiratory
failure, does have some underlying lung disease including bronchiectasis as well
as diabetes mellitus.  She is a candidate for remdesivir.  We will give
ivermectin as well in addition to the corticosteroids and vitamins.  She is on
antibacterials for possible secondary bacterial pneumonitis.  She remains
somewhat tenuous at this point.  Continue to monitor closely.  Adjust oxygen
support as required.
 
 
 
 
 
 
 
 
 
 
 
 
 
 
 
 
  <ELECTRONICALLY SIGNED>
   By: Epifanoi Birmingham MD           
  01/16/21     1612
D: 01/16/21 1120                           _____________________________________
T: 01/16/21 1221                           Epifanio Birmingham MD             /nt

## 2021-01-16 NOTE — EKG
[address]
[place]  [address]
Phone:  [phone]                    ELECTROCARDIOGRAM REPORT      
_______________________________________________________________________________
 
Name:       DEEPTHI JEWELL               Room #:         170-6       ADM IN  
M.R.#:      [id number]     Account #:      [id number]  
Admission:  21    Attend Phys:    Yang Messina MD      
Discharge:              Date of Birth:  [birth year]  
                                                          Report #: [id number]
   [id number]
_______________________________________________________________________________
                         [facility] ED
                                       
Test Date:    2021               Test Time:    00:48:11
Pat Name:     DEEPTHI JEWELL            Department:   
Patient ID:   [id number]            Room:         170
Gender:       F                        Technician:   indira
:          1945               Requested By: Daniel Jorge
Order Number: [id number] MD:   Kain Carballo
                                 Measurements
Intervals                              Axis          
Rate:         62                       P:            -37
AZ:           207                      QRS:          -43
QRSD:         125                      T:            -19
QT:           447                                    
QTc:          454                                    
                           Interpretive Statements
Sinus rhythm
Nonspecific IVCD with LAD
Borderline T abnormalities, inferior leads
Compared to ECG 2021 04:43:33
No significant change was found
Electronically Signed On 2021 12:35:35 CST by Kain Carballo
[email or web address]
 
 
 
 
 
 
 
 
 
 
 
 
 
 
 
 
 
 
 
 
  <ELECTRONICALLY SIGNED>
   By: Kain Carballo MD, [facility]   
  21     1235
D: 21 0048                           _____________________________________
T: 21                           Kain Carballo MD, FAC     /EPI

## 2021-01-17 VITALS — SYSTOLIC BLOOD PRESSURE: 144 MMHG | DIASTOLIC BLOOD PRESSURE: 67 MMHG

## 2021-01-17 VITALS — DIASTOLIC BLOOD PRESSURE: 62 MMHG | SYSTOLIC BLOOD PRESSURE: 141 MMHG

## 2021-01-17 VITALS — SYSTOLIC BLOOD PRESSURE: 139 MMHG | DIASTOLIC BLOOD PRESSURE: 68 MMHG

## 2021-01-17 VITALS — DIASTOLIC BLOOD PRESSURE: 68 MMHG | SYSTOLIC BLOOD PRESSURE: 156 MMHG

## 2021-01-17 LAB
ALBUMIN SERPL-MCNC: 2.6 G/DL (ref 3.4–5)
ALT SERPL-CCNC: 25 U/L (ref 14–59)
ANION GAP SERPL CALC-SCNC: 13 MMOL/L (ref 7–16)
AST SERPL-CCNC: 22 U/L (ref 15–37)
BILIRUB DIRECT SERPL-MCNC: < 0.1 MG/DL
BILIRUB SERPL-MCNC: 0.3 MG/DL (ref 0.2–1)
BUN SERPL-MCNC: 12 MG/DL (ref 7–18)
CALCIUM SERPL-MCNC: 8.6 MG/DL (ref 8.5–10.1)
CHLORIDE SERPL-SCNC: 105 MMOL/L (ref 98–107)
CO2 SERPL-SCNC: 22 MMOL/L (ref 21–32)
CREAT SERPL-MCNC: 1 MG/DL (ref 0.6–1)
ERYTHROCYTE [DISTWIDTH] IN BLOOD BY AUTOMATED COUNT: 14.9 % (ref 10.5–14.5)
GLUCOSE SERPL-MCNC: 170 MG/DL (ref 74–106)
HCT VFR BLD CALC: 27.5 % (ref 37–47)
HGB BLD-MCNC: 8.9 GM/DL (ref 12–15)
MCH RBC QN AUTO: 25.5 PG (ref 26–34)
MCHC RBC AUTO-ENTMCNC: 32.3 G/DL (ref 28–37)
MCV RBC: 79.1 FL (ref 80–100)
PHOSPHATE SERPL-MCNC: 2 MG/DL (ref 2.6–4.7)
PLATELET # BLD: 304 THOU/UL (ref 150–400)
POTASSIUM SERPL-SCNC: 3.6 MMOL/L (ref 3.5–5.1)
PROT SERPL-MCNC: 6.7 G/DL (ref 6.4–8.2)
RBC # BLD AUTO: 3.48 MIL/UL (ref 4.2–5)
SODIUM SERPL-SCNC: 140 MMOL/L (ref 136–145)
WBC # BLD AUTO: 7.8 THOU/UL (ref 4–11)

## 2021-01-17 NOTE — NUR
PT TRANSFERRING TO BEDSIDE COMMODE WITH ASSIST AND IS TOLERATING FAIR.  DENIES
PAIN.  RESTING COMFORTABLY.  NO NEEDS VOICED.  CALL LIGHT WITHIN REACH.
FREQUENT OBSERVATION.

## 2021-01-17 NOTE — NUR
DURING ADMINISTRATION OF EVENING MEDICATION, PT WAS ANIMATED AND YELLING AT
THE FOOTBALL GAME ON TV. EDUCATION PROVIDED TO PT REGARDING NEW ANTI-ANXIETY
MEDICATION AND ADMINISTRATION OF NEW DOSE OF INSULIN ORDERED PER MD. PT ASKED
HOW MUCH INSULIN WAS ADMINISTERED, THIS RN REPEATED MULTIPLE TIMES DOSE GIVEN.
PT STATED 'OH, I BETTER EAT THEN.' THIS RN ENCOURAGED PT TO EAT AS INSULIN IS
GIVEN WITH MEALS AND MEALS ARE CARB COUNTED FOR PT OPTIM[employer] HE[facility]. PT STATES
SHE DID NOT EAT ALL HER MEALS EARLIER AND PLANNED ON 'HOLDING BACK SOME FOOD
FOR LATER.' THIS RN ENCOURAGED PT TO EAT MEAL TRAY AND ADDITIONAL SNACKS COULD
BE PROVIDED LATER, IF NEEDED. PT VERBALIZED INTENT TO EAT DINNER TRAY AND
STATED NO FURTHER QUESTIONS.

## 2021-01-17 NOTE — NUR
PT REQUESTED GLUCOSE TO BE READ WHEN INSULIN BECAME AVAILABLE. PT HAD EATEN
APPROXIMATELY 1/2 BANANA SINCE LAST GLUCOSE CHECK .
BLOOD SUGAR  ON NEW READING, PT CONTINUED TO REFUSE SLIDING SCALE,
STATED SHE WILL ONLY TAKE 3 UNITS.

## 2021-01-18 VITALS — DIASTOLIC BLOOD PRESSURE: 63 MMHG | SYSTOLIC BLOOD PRESSURE: 135 MMHG

## 2021-01-18 VITALS — SYSTOLIC BLOOD PRESSURE: 152 MMHG | DIASTOLIC BLOOD PRESSURE: 71 MMHG

## 2021-01-18 VITALS — DIASTOLIC BLOOD PRESSURE: 78 MMHG | SYSTOLIC BLOOD PRESSURE: 145 MMHG

## 2021-01-18 VITALS — DIASTOLIC BLOOD PRESSURE: 68 MMHG | SYSTOLIC BLOOD PRESSURE: 161 MMHG

## 2021-01-18 LAB
ALBUMIN SERPL-MCNC: 2.5 G/DL (ref 3.4–5)
ALT SERPL-CCNC: 20 U/L (ref 30–65)
ANION GAP SERPL CALC-SCNC: 15 MMOL/L (ref 7–16)
AST SERPL-CCNC: 21 U/L (ref 15–37)
BASOPHILS NFR BLD AUTO: 0.4 % (ref 0–2)
BILIRUB DIRECT SERPL-MCNC: < 0.1 MG/DL
BILIRUB SERPL-MCNC: 0.2 MG/DL (ref 0.2–1)
BUN SERPL-MCNC: 11 MG/DL (ref 7–18)
CALCIUM SERPL-MCNC: 8.4 MG/DL (ref 8.5–10.1)
CHLORIDE SERPL-SCNC: 109 MMOL/L (ref 98–107)
CO2 SERPL-SCNC: 20 MMOL/L (ref 21–32)
CREAT SERPL-MCNC: 1.1 MG/DL (ref 0.6–1)
EOSINOPHIL NFR BLD: 0 % (ref 0–3)
ERYTHROCYTE [DISTWIDTH] IN BLOOD BY AUTOMATED COUNT: 15 % (ref 10.5–14.5)
GLUCOSE SERPL-MCNC: 173 MG/DL (ref 74–106)
GRANULOCYTES NFR BLD MANUAL: 85.7 % (ref 36–66)
HCT VFR BLD CALC: 29.5 % (ref 37–47)
HGB BLD-MCNC: 9.4 GM/DL (ref 12–15)
LYMPHOCYTES NFR BLD AUTO: 6.9 % (ref 24–44)
MCH RBC QN AUTO: 25.6 PG (ref 26–34)
MCHC RBC AUTO-ENTMCNC: 32 G/DL (ref 28–37)
MCV RBC: 80 FL (ref 80–100)
MONOCYTES NFR BLD: 7 % (ref 1–8)
NEUTROPHILS # BLD: 8.9 THOU/UL (ref 1.4–8.2)
PHOSPHATE SERPL-MCNC: 2.1 MG/DL (ref 2.5–4.9)
PLATELET # BLD: 376 THOU/UL (ref 150–400)
POTASSIUM SERPL-SCNC: 3.4 MMOL/L (ref 3.5–5.1)
PROT SERPL-MCNC: 6.5 G/DL (ref 6.4–8.2)
RBC # BLD AUTO: 3.69 MIL/UL (ref 4.2–5)
SODIUM SERPL-SCNC: 144 MMOL/L (ref 136–145)
WBC # BLD AUTO: 10.4 THOU/UL (ref 4–11)

## 2021-01-18 NOTE — NUR
PT TRANSFERRING TO BEDSIDE COMMODE WITH ASSIST AND IS TOLERATING FAIR.  DENIES
PAIN.  COMPLAINING OF A SOUR STOMACH--A ONE TIME OR HARRY FOR MYLANTA
ADMINISTERED.  RESTING COMFORTABLY.  NO NEEDS VOICED.  CALL LIGHT WITHIN
REACH.  FREQUENT OBSERVATION.

## 2021-01-18 NOTE — NUR
PT ALERT AND ORIENTED TIMES FOUR WITH SOMEWHAT BLUNTED AFFECT. VSS. IVF
INFUSING PER ORDER. SR ON TELE. PT TOLERATES MED AND MEALS. PT UP TO CHAIR FOR
SOME PART OF THE SHIFT. PT SLOWLY PROGRESSING DAVIDS POC GOALS.

## 2021-01-19 VITALS — SYSTOLIC BLOOD PRESSURE: 145 MMHG | DIASTOLIC BLOOD PRESSURE: 68 MMHG

## 2021-01-19 VITALS — SYSTOLIC BLOOD PRESSURE: 161 MMHG | DIASTOLIC BLOOD PRESSURE: 66 MMHG

## 2021-01-19 VITALS — SYSTOLIC BLOOD PRESSURE: 158 MMHG | DIASTOLIC BLOOD PRESSURE: 77 MMHG

## 2021-01-19 VITALS — DIASTOLIC BLOOD PRESSURE: 76 MMHG | SYSTOLIC BLOOD PRESSURE: 150 MMHG

## 2021-01-19 LAB
ALBUMIN SERPL-MCNC: 2.6 G/DL (ref 3.4–5)
ALT SERPL-CCNC: 27 U/L (ref 30–65)
ANION GAP SERPL CALC-SCNC: 10 MMOL/L (ref 7–16)
AST SERPL-CCNC: 16 U/L (ref 15–37)
BILIRUB DIRECT SERPL-MCNC: < 0.1 MG/DL
BILIRUB SERPL-MCNC: 0.2 MG/DL (ref 0.2–1)
BUN SERPL-MCNC: 12 MG/DL (ref 7–18)
CALCIUM SERPL-MCNC: 8.1 MG/DL (ref 8.5–10.1)
CHLORIDE SERPL-SCNC: 108 MMOL/L (ref 98–107)
CO2 SERPL-SCNC: 26 MMOL/L (ref 21–32)
CREAT SERPL-MCNC: 1.1 MG/DL (ref 0.6–1)
GLUCOSE SERPL-MCNC: 166 MG/DL (ref 74–106)
PHOSPHATE SERPL-MCNC: 1.8 MG/DL (ref 2.5–4.9)
POTASSIUM SERPL-SCNC: 3 MMOL/L (ref 3.5–5.1)
PROT SERPL-MCNC: 6.6 G/DL (ref 6.4–8.2)
SODIUM SERPL-SCNC: 144 MMOL/L (ref 136–145)

## 2021-01-19 NOTE — NUR
Case opened to follow for dc planning. Pt is currently in enhanced ISO due to
Covid + on 1-11-21. Chart reviewed and case discussed with the care team.
Writer visited with the pt via phone. The pt was A&ox4 and able to converse
about her medical situation. She reports that she lives alone in her own home
with 5 steps to enter. She has family (her dtr and gsons) who come by
regularly to check on her. She uses a cane and rolator walker in the home. Her
pcp is Dr. Jeanine Love. She states she is very independent and she does not
need any dc planning support. HH/SNF options presented should these be
recommended by therapy;however the pt was adament that she does not need any
support/assist/or rehab at dc. She intends to go directly home. Recommendation
made that the pt see if a family member can stay with her at dc. She will
discuss with her dtr and feels family can likely stay with her. She reports
that she got Covid from her dtr but that she has recovered now. PT/OT are
working with the pt. Pt getting last dose of remdesivir tomorrow and likely
switching to po atb/steriods. Will follow and revisit with the pt pending the
care team recommendations. Pt now off o2.

## 2021-01-19 NOTE — NUR
CARE ASSUMED 1900.  PT ALERT AND ORIENTED.  VITALS STABLE.  DENIES SOB.
COUGHING UP CLEAR FLAME.   PT ON 1L OF NC.  BG ACHS. HS , 4 UNITS OF
INSULIN NEEDED BUT PT REQUESTED ONLY 3 UNITS.  SR ON THE MONITOR.  NO OTHER
CONCERNS .  WILL CONTINUE WITH POC.

## 2021-01-20 VITALS — DIASTOLIC BLOOD PRESSURE: 74 MMHG | SYSTOLIC BLOOD PRESSURE: 149 MMHG

## 2021-01-20 VITALS — DIASTOLIC BLOOD PRESSURE: 77 MMHG | SYSTOLIC BLOOD PRESSURE: 150 MMHG

## 2021-01-20 VITALS — SYSTOLIC BLOOD PRESSURE: 150 MMHG | DIASTOLIC BLOOD PRESSURE: 77 MMHG

## 2021-01-20 VITALS — DIASTOLIC BLOOD PRESSURE: 102 MMHG | SYSTOLIC BLOOD PRESSURE: 132 MMHG

## 2021-01-20 LAB
ANION GAP SERPL CALC-SCNC: 8 MMOL/L (ref 7–16)
BUN SERPL-MCNC: 10 MG/DL (ref 7–18)
CALCIUM SERPL-MCNC: 8.1 MG/DL (ref 8.5–10.1)
CHLORIDE SERPL-SCNC: 107 MMOL/L (ref 98–107)
CO2 SERPL-SCNC: 30 MMOL/L (ref 21–32)
CREAT SERPL-MCNC: 0.9 MG/DL (ref 0.6–1)
ERYTHROCYTE [DISTWIDTH] IN BLOOD BY AUTOMATED COUNT: 14.7 % (ref 10.5–14.5)
GLUCOSE SERPL-MCNC: 119 MG/DL (ref 74–106)
HCT VFR BLD CALC: 27.6 % (ref 37–47)
HGB BLD-MCNC: 8.9 GM/DL (ref 12–15)
MAGNESIUM SERPL-MCNC: 1.4 MG/DL (ref 1.8–2.4)
MCH RBC QN AUTO: 25.5 PG (ref 26–34)
MCHC RBC AUTO-ENTMCNC: 32.3 G/DL (ref 28–37)
MCV RBC: 79 FL (ref 80–100)
PHOSPHATE SERPL-MCNC: 2.8 MG/DL (ref 2.6–4.7)
PLATELET # BLD: 387 THOU/UL (ref 150–400)
POTASSIUM SERPL-SCNC: 2.9 MMOL/L (ref 3.5–5.1)
RBC # BLD AUTO: 3.49 MIL/UL (ref 4.2–5)
SODIUM SERPL-SCNC: 145 MMOL/L (ref 136–145)
WBC # BLD AUTO: 7.3 THOU/UL (ref 4–11)

## 2021-01-20 NOTE — NUR
PATIENT NOW SLEEPING AND RESPIRATINOS ARE NON LABORED. SHE WILL BE DISCHARGED
HOME THIS PM. SHE STATES SHE IS READY TO GO. SHE HAS REFUSED HH. WILL CONT
WITH PLAN OF CARE.

## 2021-01-20 NOTE — NUR
DISCHARGE NOTE:
SW reviewed chart and spoke with nursing and attending physician. Pt has
discharge orders to go home with HH services today. SW placed multiple calls
to pt's room. No answer. Pt's nurse called SW from pt's room. SW discussed
recommendation for HH services. Pt states that she has [employer] friends who are
HH care workers and they will be able to assist her. SW asked if they work for
a HH agency and do they need any ppwk. Pt states they do not need ppwk and
that she will be fine and she will transportation home. Pt states that ID told
her she will be staying in the hospital for another day. SW explained that the
attending physician has discharged her. SW updated pt's nurse who will clarify
with physician. No discharge needs identified at this time, but is available
to assist should needs arise.

## 2021-01-20 NOTE — NUR
LAB CALLED WITH CRITICALLY LOW K+ OF 2.9.  FOLLOWED PROTOCOL AND RECEIVED
ORDERS FOR PO REPLACEMENT.  PT IN [facility] AND SLEPT MOST OF NIGHT.  VSS
AND POC WITH IVF GTT.  CALL LIGHT WITHIN REACH.

## 2021-03-27 ENCOUNTER — HOSPITAL ENCOUNTER (INPATIENT)
Dept: HOSPITAL 35 - ER | Age: 76
LOS: 2 days | Discharge: HOME | DRG: 305 | End: 2021-03-29
Attending: HOSPITALIST | Admitting: HOSPITALIST
Payer: COMMERCIAL

## 2021-03-27 VITALS — SYSTOLIC BLOOD PRESSURE: 154 MMHG | DIASTOLIC BLOOD PRESSURE: 71 MMHG

## 2021-03-27 VITALS — DIASTOLIC BLOOD PRESSURE: 47 MMHG | SYSTOLIC BLOOD PRESSURE: 164 MMHG

## 2021-03-27 VITALS — SYSTOLIC BLOOD PRESSURE: 160 MMHG | DIASTOLIC BLOOD PRESSURE: 74 MMHG

## 2021-03-27 VITALS — DIASTOLIC BLOOD PRESSURE: 83 MMHG | SYSTOLIC BLOOD PRESSURE: 192 MMHG

## 2021-03-27 VITALS — SYSTOLIC BLOOD PRESSURE: 159 MMHG | DIASTOLIC BLOOD PRESSURE: 73 MMHG

## 2021-03-27 VITALS — BODY MASS INDEX: 28.25 KG/M2 | WEIGHT: 180 LBS | HEIGHT: 67.01 IN

## 2021-03-27 VITALS — SYSTOLIC BLOOD PRESSURE: 179 MMHG | DIASTOLIC BLOOD PRESSURE: 81 MMHG

## 2021-03-27 DIAGNOSIS — E87.6: ICD-10-CM

## 2021-03-27 DIAGNOSIS — E83.42: ICD-10-CM

## 2021-03-27 DIAGNOSIS — I16.0: Primary | ICD-10-CM

## 2021-03-27 DIAGNOSIS — I12.9: ICD-10-CM

## 2021-03-27 DIAGNOSIS — E11.22: ICD-10-CM

## 2021-03-27 DIAGNOSIS — Z79.899: ICD-10-CM

## 2021-03-27 DIAGNOSIS — Z86.16: ICD-10-CM

## 2021-03-27 DIAGNOSIS — I20.9: ICD-10-CM

## 2021-03-27 DIAGNOSIS — F41.1: ICD-10-CM

## 2021-03-27 DIAGNOSIS — Z88.8: ICD-10-CM

## 2021-03-27 DIAGNOSIS — K21.9: ICD-10-CM

## 2021-03-27 DIAGNOSIS — N28.9: ICD-10-CM

## 2021-03-27 DIAGNOSIS — D86.9: ICD-10-CM

## 2021-03-27 DIAGNOSIS — D63.1: ICD-10-CM

## 2021-03-27 DIAGNOSIS — N18.30: ICD-10-CM

## 2021-03-27 LAB
ALBUMIN SERPL-MCNC: 3.3 G/DL (ref 3.4–5)
ALT SERPL-CCNC: 25 U/L (ref 14–59)
AMYLASE SERPL-CCNC: 113 U/L (ref 25–115)
ANION GAP SERPL CALC-SCNC: 7 MMOL/L (ref 7–16)
AST SERPL-CCNC: 15 U/L (ref 15–37)
BASOPHILS NFR BLD AUTO: 1.3 % (ref 0–2)
BILIRUB DIRECT SERPL-MCNC: 0.2 MG/DL
BILIRUB SERPL-MCNC: 0.8 MG/DL (ref 0.2–1)
BILIRUB UR-MCNC: NEGATIVE MG/DL
BUN SERPL-MCNC: 14 MG/DL (ref 7–18)
CALCIUM SERPL-MCNC: 8.6 MG/DL (ref 8.5–10.1)
CHLORIDE SERPL-SCNC: 105 MMOL/L (ref 98–107)
CO2 SERPL-SCNC: 29 MMOL/L (ref 21–32)
COLOR UR: YELLOW
CREAT SERPL-MCNC: 1.2 MG/DL (ref 0.6–1)
EOSINOPHIL NFR BLD: 3.2 % (ref 0–3)
ERYTHROCYTE [DISTWIDTH] IN BLOOD BY AUTOMATED COUNT: 15.9 % (ref 10.5–14.5)
GLUCOSE SERPL-MCNC: 119 MG/DL (ref 74–106)
GRANULOCYTES NFR BLD MANUAL: 56.8 % (ref 36–66)
HCT VFR BLD CALC: 29.6 % (ref 37–47)
HGB BLD-MCNC: 9.5 GM/DL (ref 12–15)
KETONES UR STRIP-MCNC: NEGATIVE MG/DL
LIPASE: 118 U/L (ref 73–393)
LYMPHOCYTES NFR BLD AUTO: 30.6 % (ref 24–44)
MAGNESIUM SERPL-MCNC: 1.7 MG/DL (ref 1.8–2.4)
MCH RBC QN AUTO: 26.3 PG (ref 26–34)
MCHC RBC AUTO-ENTMCNC: 32.2 G/DL (ref 28–37)
MCV RBC: 81.6 FL (ref 80–100)
MONOCYTES NFR BLD: 8.1 % (ref 1–8)
NEUTROPHILS # BLD: 3.4 THOU/UL (ref 1.4–8.2)
PHOSPHATE SERPL-MCNC: 3.4 MG/DL (ref 2.6–4.7)
PLATELET # BLD: 273 THOU/UL (ref 150–400)
POTASSIUM SERPL-SCNC: 3.3 MMOL/L (ref 3.5–5.1)
PROT SERPL-MCNC: 7 G/DL (ref 6.4–8.2)
RBC # BLD AUTO: 3.63 MIL/UL (ref 4.2–5)
RBC # UR STRIP: NEGATIVE /UL
SODIUM SERPL-SCNC: 141 MMOL/L (ref 136–145)
SP GR UR STRIP: 1.02 (ref 1–1.03)
TROPONIN I SERPL-MCNC: <0.06 NG/ML (ref ?–0.06)
URINE CLARITY: CLEAR
URINE GLUCOSE-RANDOM*: NEGATIVE
URINE LEUKOCYTES-REFLEX: NEGATIVE
URINE NITRITE-REFLEX: NEGATIVE
URINE PROTEIN (DIPSTICK): NEGATIVE
UROBILINOGEN UR STRIP-ACNC: 0.2 E.U./DL (ref 0.2–1)
WBC # BLD AUTO: 6 THOU/UL (ref 4–11)

## 2021-03-27 PROCEDURE — 10081 I&D PILONIDAL CYST COMP: CPT

## 2021-03-27 NOTE — 2DMMODE
[facility]
[place]   [address]                   2 D/M-MODE ECHOCARDIOGRAM     
_______________________________________________________________________________
 
Name:       DEEPTHI JEWELL               Room #:         217-P       ADM IN  
M.R.#:      [id number]                       Account #:      [id number]  
Admission:  21    Attend Phys:    Lindsay Ferguson MD  
Discharge:              Date of Birth:  [birth year]  
                                                          Report #: [id number]
                                                                    [id number]
_______________________________________________________________________________
THIS REPORT FOR:  
 
cc:  Jeanine Tam MD, Stany A. MD Santiago, Patrick MD [facility]                                         
                                                                       ~
 
--------------- APPROVED REPORT --------------
 
 
Study performed:  2021 10:14:59
 
EXAM: Comprehensive 2D, Doppler, and color-flow 
Echocardiogram 
Patient Location: Bedside   
Room #:  ER-9     Status:  on-call
 
      BSA:         1.93
HR: 61 bpm BP:          177/67 mmHg 
Rhythm: NSR     
 
Other Information 
Study Quality: Adequate
 
Risk Factors: 
Cardiac Risk Factors:  HTN
 
Indications
Chest Pain
Hypertension/HDD
 
2D Dimensions
IVSd:  18.64 (7-11mm)  LVOT Diam:  20.00 (18-24mm) 
LVDd:  38.55 mm   
PWd:  15.72 (7-11mm)  Ascending Ao:  36.11 (22-36mm)
LVDs:  23.71 (25-40mm)  
Aortic Root:  29.29 mm  
LV Single Plane 4CH:  54.68 % 
LV Single Plane 2CH:  53.43 % 
Biplane EF:  54.6 %  
 
Volumes
Left Atrial Volume (Systole) 
Single Plane 4CH:  54.46 mL Single Plane 2CH:  48.74 mL
    LA ESV Index:  29.00 mL/m2
 
 
 
[facility]
[employer]
[place]  [address]
Phone:  [phone] 2 D/M-MODE ECHOCARDIOGRAM     
_______________________________________________________________________________
 
Name:            DEEPTHI JEWELL               Room #:        217-P       [facility] [place]
.R.#:           [id number]          Account #:     [id number]  
Admission:       21         Attend Phys:   Lindsay Ferguson, 
Discharge:                  Date of Birth: [birth year]  
                         Report #:      [id number]
        [id number]
_______________________________________________________________________________
Aortic Valve
AoV Peak Liang.:  1.62 m/s 
AO Peak Gr.:  10.56 mmHg LVOT Max P.35 mmHg
    LVOT Max V:  1.04 m/s
KRISSY Vmax: 1.93 cm2  
 
Mitral Valve
    E/A Ratio:  1.1
    MV Decel. Time:  237.69 ms
MV E Max Liang.:  0.90 m/s 
MV A Liang.:  0.84 m/s  
MV PHT:  68.93 ms  
IVRT:  86.51 ms   
 
TDI
E/Lateral E':  12.86 E/Medial E':  12.86
   Medial E' Liang.:  0.07 m/s
   Lateral E' Liang.:  0.07 m/s
 
Pulmonary Valve
PV Peak Liang.:  1.23 m/s PV Peak Gr.:  6.04 mmHg
   NE End Vmax:  1.03 m/s
 
Pulmonary Vein
P Vein S:    0.70 m/s P Vein A:  0.27 m/s
P Vein D:   0.48 m/s P Vein A Dur.:  107.3 msec
P Vein S/D Ratio:  1.46 
 
Tricuspid Valve
TR Peak Liang.:  2.65 m/s  RAP Estimate:  7.00 mmHg
TR Peak Gr.:  28.11 mmHg 
    PA Pressure:  35.00 mmHg
 
Left Ventricle
The left ventricle is normal size. There is normal LV segmental wall 
motion. Moderate concentric left ventricular hypertrophy. Left 
ventricular systolic function is normal. The left ventricular 
ejection fraction is within the normal range. LVEF is 55-60%. 
Moderate diastolic dysfunction is present (pseudonormal 
filling).
 
Right Ventricle
The right ventricle is normal size. The right ventricular systolic 
function is normal.
 
[facility]
[address] [place]  [address]
Phone:  [phone]                    2 D/M-MODE ECHOCARDIOGRAM     
_______________________________________________________________________________
 
Name:            FANTADAVIDDEEPTHI CLARA               Room #:        217-P       [facility] [place]
.R.#:           [id number]          Account #:     [id number]  
Admission:       21         Attend Phys:   Lindsay Ferguson, 
Discharge:                  Date of Birth: [birth year]  
                         Report #:      [id number]
        [id number]
_______________________________________________________________________________
The left atrium size is normal. The right atrium size is 
normal.
 
Aortic Valve
The aortic valve is normal in structure. No aortic regurgitation is 
present. There is no aortic valvular stenosis.
 
Mitral Valve
The mitral valve is normal in structure. Trace mitral regurgitation. 
No evidence of mitral valve stenosis.
 
Tricuspid Valve
The tricuspid valve is normal in structure. Trace tricuspid 
regurgitation. Pulmonary artery pressure is 35 mmHg.
 
Pulmonic Valve
The pulmonary valve is normal in structure. Trace pulmonic 
regurgitation.
 
Great Vessels
The aortic root is normal in size. The ascending aorta is normal in 
size. IVC is normal in size and collapses >50% with 
inspiration.
 
Pericardium
There is no pericardial effusion.
 
<Conclusion>
Normal left ventricular size with moderate concentric 
hypertrophy
Ejection fraction 60% grade 3
Normal right ventricle size/function
Normal atrial size
Color-flow Doppler study was performed of the 
aortic/mitral/tricuspid/pulmonary valve
Normal aortic valve structure and function
Trace mitral valve insufficiency
Trace tricuspid valve insufficiency
Pulmonary systolic pressure estimated 35 mmHg
No pericardial effusion
Normal aortic root size.
 
 
 
  <ELECTRONICALLY SIGNED>
   By: Yared Streeter MD, FACC    
  21     160
D: 21                           _____________________________________
T: 21                           Yared Streeter MD, FACC      /INF

## 2021-03-27 NOTE — NUR
PT ADMITTED FOR CHEST PAIN AND HTN EMERGENCY, PT STABLE AT THIS TIME AND
DENIES PAIN OR DISCOMFORT. STEADY WITH AMBULTION. NO SIGNS OF DISTRESS NOTED.
PT STATES SHES LOOKING FORWARD TO THE HEART WORK UP BECAUSE SHE IS GETTING
OLDER AND ITS BEEN AWHILE. PT DENIES CHEST PAIN

## 2021-03-28 VITALS — SYSTOLIC BLOOD PRESSURE: 136 MMHG | DIASTOLIC BLOOD PRESSURE: 69 MMHG

## 2021-03-28 VITALS — DIASTOLIC BLOOD PRESSURE: 68 MMHG | SYSTOLIC BLOOD PRESSURE: 150 MMHG

## 2021-03-28 VITALS — DIASTOLIC BLOOD PRESSURE: 72 MMHG | SYSTOLIC BLOOD PRESSURE: 142 MMHG

## 2021-03-28 VITALS — DIASTOLIC BLOOD PRESSURE: 62 MMHG | SYSTOLIC BLOOD PRESSURE: 138 MMHG

## 2021-03-28 LAB
ANION GAP SERPL CALC-SCNC: 7 MMOL/L (ref 7–16)
BUN SERPL-MCNC: 13 MG/DL (ref 7–18)
CALCIUM SERPL-MCNC: 8.2 MG/DL (ref 8.5–10.1)
CHLORIDE SERPL-SCNC: 108 MMOL/L (ref 98–107)
CO2 SERPL-SCNC: 26 MMOL/L (ref 21–32)
CREAT SERPL-MCNC: 1.1 MG/DL (ref 0.6–1)
GLUCOSE SERPL-MCNC: 109 MG/DL (ref 74–106)
MAGNESIUM SERPL-MCNC: 2.2 MG/DL (ref 1.8–2.4)
PHOSPHATE SERPL-MCNC: 2.9 MG/DL (ref 2.6–4.7)
POTASSIUM SERPL-SCNC: 4 MMOL/L (ref 3.5–5.1)
SODIUM SERPL-SCNC: 141 MMOL/L (ref 136–145)

## 2021-03-28 NOTE — EKG
[place] [address] [facility] [place] [place]  [address]
Phone:  [phone]                    ELECTROCARDIOGRAM REPORT      
_______________________________________________________________________________
 
Name:       DAVID JEWELLOTHY CLARA               Room #:         217-P       ADM IN  
M.R.#:      [id number]     Account #:      [id number]  
Admission:  21    Attend Phys:    Lindsay Ferguson MD  
Discharge:              Date of Birth:  [birth year]  
                                                          Report #: [id number]
   [id number]
_______________________________________________________________________________
                         [facility] ED
                                       
Test Date:    2021               Test Time:    06:04:03
Pat Name:     DEEPTHI JEWELL            Department:   
Patient ID:   [id number]            Room:         217
Gender:       F                        Technician:   michael
:          1945               Requested By: Gus Dobson
Order Number: [id number] MD:   Yared Streeter
                                 Measurements
Intervals                              Axis          
Rate:         65                       P:            -23
ME:           230                      QRS:          -39
QRSD:         126                      T:            8
QT:           429                                    
QTc:          447                                    
                           Interpretive Statements
Sinus rhythm
Prolonged ME interval
Nonspecific IVCD with LAD
Left ventricular hypertrophy
Compared to ECG 2021 00:48:11
First degree AV block now present
Left ventricular hypertrophy now present
T-wave abnormality no longer present
Electronically Signed On 3- 11:27:06 CDT by Yared Streeter
[email or web address]
 
 
 
 
 
 
 
 
 
 
 
 
 
 
 
 
 
  <ELECTRONICALLY SIGNED>
   By: Yared Streeter MD, FAC    
  21     1127
D: 21 0604                           _____________________________________
T: 21 0604                           Yared Streeter MD, [facility]      /EPI

## 2021-03-28 NOTE — NUR
PT IS ALERT AND ORIENTED X4. LUNGS ARE CLEAR COMPLAINED OF MUCUS REQUEST
BREATHING TREATMENT PER RT. UP TO BATHROOM AD LIDIA VERY LITTLE ASSISTANCE PER
NURSING. NPO AFTER MN. GETING A CARDIAC WORK UP TODAY. STRESS TEST. NOT IN ANY
PAIN SLEEPING AT THIS TIME. WILL CONTINUE TO MONITOR AND ASSESS PER NURSING.
CALL LIGHT WITHIN REACH IF NEEDS ASSISTANCE.

## 2021-03-29 VITALS — SYSTOLIC BLOOD PRESSURE: 146 MMHG | DIASTOLIC BLOOD PRESSURE: 59 MMHG

## 2021-03-29 VITALS — SYSTOLIC BLOOD PRESSURE: 179 MMHG | DIASTOLIC BLOOD PRESSURE: 92 MMHG

## 2021-03-29 VITALS — DIASTOLIC BLOOD PRESSURE: 77 MMHG | SYSTOLIC BLOOD PRESSURE: 145 MMHG

## 2021-03-29 VITALS — SYSTOLIC BLOOD PRESSURE: 142 MMHG | DIASTOLIC BLOOD PRESSURE: 79 MMHG

## 2021-03-29 VITALS — SYSTOLIC BLOOD PRESSURE: 140 MMHG | DIASTOLIC BLOOD PRESSURE: 76 MMHG

## 2021-03-29 LAB
ANION GAP SERPL CALC-SCNC: 6 MMOL/L (ref 7–16)
BASOPHILS NFR BLD AUTO: 0.7 % (ref 0–2)
BUN SERPL-MCNC: 13 MG/DL (ref 7–18)
CALCIUM SERPL-MCNC: 8.5 MG/DL (ref 8.5–10.1)
CHLORIDE SERPL-SCNC: 106 MMOL/L (ref 98–107)
CO2 SERPL-SCNC: 30 MMOL/L (ref 21–32)
CREAT SERPL-MCNC: 1.2 MG/DL (ref 0.6–1)
EOSINOPHIL NFR BLD: 3.4 % (ref 0–3)
ERYTHROCYTE [DISTWIDTH] IN BLOOD BY AUTOMATED COUNT: 15.7 % (ref 10.5–14.5)
GLUCOSE SERPL-MCNC: 104 MG/DL (ref 74–106)
GRANULOCYTES NFR BLD MANUAL: 53.8 % (ref 36–66)
HCT VFR BLD CALC: 26.2 % (ref 37–47)
HGB BLD-MCNC: 8.3 GM/DL (ref 12–15)
LYMPHOCYTES NFR BLD AUTO: 33.2 % (ref 24–44)
MCH RBC QN AUTO: 25.8 PG (ref 26–34)
MCHC RBC AUTO-ENTMCNC: 31.8 G/DL (ref 28–37)
MCV RBC: 81.3 FL (ref 80–100)
MONOCYTES NFR BLD: 8.9 % (ref 1–8)
NEUTROPHILS # BLD: 2.6 THOU/UL (ref 1.4–8.2)
PLATELET # BLD: 225 THOU/UL (ref 150–400)
POTASSIUM SERPL-SCNC: 4.3 MMOL/L (ref 3.5–5.1)
RBC # BLD AUTO: 3.23 MIL/UL (ref 4.2–5)
SODIUM SERPL-SCNC: 142 MMOL/L (ref 136–145)
WBC # BLD AUTO: 4.8 THOU/UL (ref 4–11)

## 2021-03-29 NOTE — NUR
ASSUMED PATIENT CARE AT 0700. PATIENT NPO FOR STRESS TEST. PATIENT TRANSPORTED
TO [facility] AT 0930. ARRIVED BACK TO ROOM AT 1200, NO ISSUES DURING CARDIAC
STRESS TEST. PATIENT WAS HYPERTNESIVE, SEE DOCUMENTATION. AFTER RECIEVING PO
MEDICATIONS BLOOD PRESSURE NORMALIZED. OK TO DISCHARGE PER PRIMARY TEAM. IV
REMOVED. PATIENT ESCORTED OUT TO [facility] AT 1725. BELONGINGS SENT WITH
PATIENT INCLUDING PURSE AND CELL PHONE.

## 2021-03-29 NOTE — NUR
PATIENT PROGRESSING TOWARDS GOALS, NUCLEAR MED TEST PLANED FOR 3/29/21. PATENT
HAS BEEN NPO SINCE MIDNIGHT, NOT CAFFINE SINCE DINNER MEAL ONLY A COUPLE OF
SIPS OF COLA AND THE REST WAS THROWN OUT PER PT. REPORT PRIOR TO 8 PM. D5 15
NS SHOULD BE ABLE TO BE STOPPED FOR NUCLEAR MEDICINE AND HER IV SL. PT HEAD
BEEN ANXIOUS ABOUT HER BLOOD SUGAR DROPPING IF SHE COULD NOT EAT. SHE WAS
EDUCATED THAT HER IV HAD THE SUGAR SHE NEEDED TO MAINTAIN HER BLOOD GLUCOSE
AND HER ACCU CHECKS WOULD BE MONITORED. PATIENT WAS ABLE TO MAINTAIN BLOOD
PRESSURE 140-160/56-60. C/O OF LEFT SIDE HEADACHE AND INNER EAR PAIN. TYLENOL
GIVEN AND ONLY HELPED MILDLY.

## 2021-03-30 NOTE — NUR
Note Given:  Y
Facility List Provided:Y
Facility Choen: None chosen at this time
[place] Np discussed BPCI with this pt 3/29/21

## 2021-04-15 ENCOUNTER — HOSPITAL ENCOUNTER (EMERGENCY)
Dept: HOSPITAL 35 - ER | Age: 76
Discharge: HOME | End: 2021-04-15
Payer: COMMERCIAL

## 2021-04-15 VITALS — HEIGHT: 67 IN | BODY MASS INDEX: 27.94 KG/M2 | WEIGHT: 178 LBS

## 2021-04-15 VITALS — SYSTOLIC BLOOD PRESSURE: 157 MMHG | DIASTOLIC BLOOD PRESSURE: 56 MMHG

## 2021-04-15 DIAGNOSIS — R07.89: Primary | ICD-10-CM

## 2021-04-15 DIAGNOSIS — I12.9: ICD-10-CM

## 2021-04-15 DIAGNOSIS — Z98.890: ICD-10-CM

## 2021-04-15 DIAGNOSIS — Z88.8: ICD-10-CM

## 2021-04-15 DIAGNOSIS — N18.9: ICD-10-CM

## 2021-04-15 DIAGNOSIS — Z79.82: ICD-10-CM

## 2021-04-15 DIAGNOSIS — Z88.1: ICD-10-CM

## 2021-04-15 DIAGNOSIS — Z79.899: ICD-10-CM

## 2021-04-15 DIAGNOSIS — E11.22: ICD-10-CM

## 2021-04-15 LAB
ANION GAP SERPL CALC-SCNC: 9 MMOL/L (ref 7–16)
BASOPHILS NFR BLD AUTO: 0.8 % (ref 0–2)
BUN SERPL-MCNC: 21 MG/DL (ref 7–18)
CALCIUM SERPL-MCNC: 8.8 MG/DL (ref 8.5–10.1)
CHLORIDE SERPL-SCNC: 107 MMOL/L (ref 98–107)
CO2 SERPL-SCNC: 28 MMOL/L (ref 21–32)
CREAT SERPL-MCNC: 1.3 MG/DL (ref 0.6–1)
EOSINOPHIL NFR BLD: 5.3 % (ref 0–3)
ERYTHROCYTE [DISTWIDTH] IN BLOOD BY AUTOMATED COUNT: 15.5 % (ref 10.5–14.5)
GLUCOSE SERPL-MCNC: 113 MG/DL (ref 74–106)
GRANULOCYTES NFR BLD MANUAL: 46.5 % (ref 36–66)
HCT VFR BLD CALC: 30.6 % (ref 37–47)
HGB BLD-MCNC: 10.2 GM/DL (ref 12–15)
LYMPHOCYTES NFR BLD AUTO: 35.4 % (ref 24–44)
MCH RBC QN AUTO: 27.2 PG (ref 26–34)
MCHC RBC AUTO-ENTMCNC: 33.4 G/DL (ref 28–37)
MCV RBC: 81.6 FL (ref 80–100)
MONOCYTES NFR BLD: 12 % (ref 1–8)
NEUTROPHILS # BLD: 2.1 THOU/UL (ref 1.4–8.2)
PLATELET # BLD: 232 THOU/UL (ref 150–400)
POTASSIUM SERPL-SCNC: 3.6 MMOL/L (ref 3.5–5.1)
RBC # BLD AUTO: 3.76 MIL/UL (ref 4.2–5)
SODIUM SERPL-SCNC: 144 MMOL/L (ref 136–145)
TROPONIN I SERPL-MCNC: <0.06 NG/ML (ref ?–0.06)
WBC # BLD AUTO: 4.6 THOU/UL (ref 4–11)

## 2021-04-16 NOTE — EKG
[facility]
[place]  [address]
Phone:  [phone]                    ELECTROCARDIOGRAM REPORT      
_______________________________________________________________________________
 
Name:       DEEPTHI JEWELL               [id number]     Account #:      [id number]  
Admission:  04/15/21    Attend Phys:                          
Discharge:  04/15/21    Date of Birth:  [birth year]  
                                                          Report #: [id number]
   [id number]
_______________________________________________________________________________
                         [facility] ED
                                       
Test Date:    2021-04-15               Test Time:    21:45:37
Pat Name:     DEEPTHI JEWELL            Department:   
Patient ID:   [id number]            Room:          
Gender:       F                        Technician:   VIKTORIYA
:          1945               Requested By: Jesús Meng
Order Number: [id number] MD:   Kain Carballo
                                 Measurements
Intervals                              Axis          
Rate:         61                       P:            -18
CT:           252                      QRS:          -43
QRSD:         129                      T:            2
QT:           443                                    
QTc:          447                                    
                           Interpretive Statements
Sinus rhythm
Prolonged CT interval
Nonspecific IVCD with LAD
Compared to ECG 2021 06:04:03
No significant changes
Electronically Signed On 2021 16:52:13 CDT by Kain Carballo
[email or web address]
 
 
 
 
 
 
 
 
 
 
 
 
 
 
 
 
 
 
 
 
  <ELECTRONICALLY SIGNED>
   By: Kain Carballo MD, [facility]   
  21     1652
D: 04/15/21 2145                           _____________________________________
T: 04/15/21 2145                           Kain Carballo MD, FACC     /EPI

## 2021-04-21 ENCOUNTER — HOSPITAL ENCOUNTER (EMERGENCY)
Dept: HOSPITAL 35 - ER | Age: 76
Discharge: HOME | End: 2021-04-21
Payer: COMMERCIAL

## 2021-04-21 VITALS — DIASTOLIC BLOOD PRESSURE: 68 MMHG | SYSTOLIC BLOOD PRESSURE: 125 MMHG

## 2021-04-21 VITALS — HEIGHT: 67 IN | WEIGHT: 175 LBS | BODY MASS INDEX: 27.47 KG/M2

## 2021-04-21 DIAGNOSIS — Z88.1: ICD-10-CM

## 2021-04-21 DIAGNOSIS — K21.9: ICD-10-CM

## 2021-04-21 DIAGNOSIS — F41.9: ICD-10-CM

## 2021-04-21 DIAGNOSIS — Z79.82: ICD-10-CM

## 2021-04-21 DIAGNOSIS — E11.22: ICD-10-CM

## 2021-04-21 DIAGNOSIS — Z79.899: ICD-10-CM

## 2021-04-21 DIAGNOSIS — Z88.8: ICD-10-CM

## 2021-04-21 DIAGNOSIS — N18.9: ICD-10-CM

## 2021-04-21 DIAGNOSIS — I12.9: Primary | ICD-10-CM

## 2021-04-21 LAB
ALBUMIN SERPL-MCNC: 3.7 G/DL (ref 3.4–5)
ALT SERPL-CCNC: 23 U/L (ref 14–59)
ANION GAP SERPL CALC-SCNC: 8 MMOL/L (ref 7–16)
AST SERPL-CCNC: 17 U/L (ref 15–37)
BASOPHILS NFR BLD AUTO: 1.7 % (ref 0–2)
BILIRUB SERPL-MCNC: 1 MG/DL (ref 0.2–1)
BILIRUB UR-MCNC: NEGATIVE MG/DL
BUN SERPL-MCNC: 13 MG/DL (ref 7–18)
CALCIUM SERPL-MCNC: 9.1 MG/DL (ref 8.5–10.1)
CHLORIDE SERPL-SCNC: 107 MMOL/L (ref 98–107)
CO2 SERPL-SCNC: 29 MMOL/L (ref 21–32)
COLOR UR: YELLOW
CREAT SERPL-MCNC: 1.1 MG/DL (ref 0.6–1)
EOSINOPHIL NFR BLD: 3.1 % (ref 0–3)
ERYTHROCYTE [DISTWIDTH] IN BLOOD BY AUTOMATED COUNT: 14.8 % (ref 10.5–14.5)
GLUCOSE SERPL-MCNC: 102 MG/DL (ref 74–106)
GRANULOCYTES NFR BLD MANUAL: 53.4 % (ref 36–66)
HCT VFR BLD CALC: 32.2 % (ref 37–47)
HGB BLD-MCNC: 10.5 GM/DL (ref 12–15)
KETONES UR STRIP-MCNC: NEGATIVE MG/DL
LYMPHOCYTES NFR BLD AUTO: 35.8 % (ref 24–44)
MCH RBC QN AUTO: 26.1 PG (ref 26–34)
MCHC RBC AUTO-ENTMCNC: 32.5 G/DL (ref 28–37)
MCV RBC: 80.3 FL (ref 80–100)
MONOCYTES NFR BLD: 6 % (ref 1–8)
NEUTROPHILS # BLD: 2.6 THOU/UL (ref 1.4–8.2)
PLATELET # BLD: 261 THOU/UL (ref 150–400)
POTASSIUM SERPL-SCNC: 3.8 MMOL/L (ref 3.5–5.1)
PROT SERPL-MCNC: 7.5 G/DL (ref 6.4–8.2)
RBC # BLD AUTO: 4.01 MIL/UL (ref 4.2–5)
RBC # UR STRIP: NEGATIVE /UL
SODIUM SERPL-SCNC: 144 MMOL/L (ref 136–145)
SP GR UR STRIP: 1.01 (ref 1–1.03)
TROPONIN I SERPL-MCNC: <0.06 NG/ML (ref ?–0.06)
URINE CLARITY: CLEAR
URINE GLUCOSE-RANDOM*: NEGATIVE
URINE LEUKOCYTES-REFLEX: (no result)
URINE NITRITE-REFLEX: NEGATIVE
URINE PROTEIN (DIPSTICK): NEGATIVE
UROBILINOGEN UR STRIP-ACNC: 0.2 E.U./DL (ref 0.2–1)
WBC # BLD AUTO: 4.9 THOU/UL (ref 4–11)

## 2021-04-21 NOTE — EKG
[facility]
[address] [place]  [address]
Phone:  [phone]                    ELECTROCARDIOGRAM REPORT      
_______________________________________________________________________________
 
Name:       DEEPTHI JEWELL               [facility]                     [facility]      [id number]     Account #:      [id number]  
Admission:  21    Attend Phys:                          
Discharge:  21    Date of Birth:  [birth year]  
                                                          Report #: [id number]
   [id number]
_______________________________________________________________________________
                         [facility] ED
                                       
Test Date:    2021               Test Time:    11:21:48
Pat Name:     DEEPTHI JEWELL            Department:   
Patient ID:   [id number]            Room:          
Gender:       F                        Technician:   LUIGI
:          1945               Requested By: Constance Howard
Order Number: [id number] MD:   Kain Carballo
                                 Measurements
Intervals                              Axis          
Rate:         54                       P:            -34
NC:           247                      QRS:          -38
QRSD:         122                      T:            -11
QT:           441                                    
QTc:          418                                    
                           Interpretive Statements
Sinus bradycardia
Prolonged NC interval
Nonspecific IVCD with LAD
Borderline T abnormalities, inferior leads
Baseline wander in lead(s) V1
Compared to ECG 04/15/2021 21:45:37
Left ventricular hypertrophy now present
Nonspecific change in the ST and T wave segments
Electronically Signed On 2021 17:37:15 CDT by Kain Carballo
[email or web address]
 
 
 
 
 
 
 
 
 
 
 
 
 
 
 
 
 
  <ELECTRONICALLY SIGNED>
   By: Kain Carballo MD, [facility]   
  21     1737
D: 21 1121                           _____________________________________
T: 21 1121                           Kain Carballo MD, [facility]     /EPI

## 2021-05-25 ENCOUNTER — HOSPITAL ENCOUNTER (OUTPATIENT)
Dept: HOSPITAL 35 - SJCVC | Age: 76
End: 2021-05-25
Attending: INTERNAL MEDICINE
Payer: COMMERCIAL

## 2021-05-25 DIAGNOSIS — E11.9: ICD-10-CM

## 2021-05-25 DIAGNOSIS — I10: Primary | ICD-10-CM

## 2021-05-25 DIAGNOSIS — F41.9: ICD-10-CM

## 2021-05-25 DIAGNOSIS — Z79.82: ICD-10-CM

## 2021-05-25 DIAGNOSIS — Z79.899: ICD-10-CM

## 2021-05-25 DIAGNOSIS — Z88.1: ICD-10-CM

## 2021-06-22 ENCOUNTER — HOSPITAL ENCOUNTER (OUTPATIENT)
Dept: HOSPITAL 35 - SJCVC | Age: 76
End: 2021-06-22
Attending: INTERNAL MEDICINE
Payer: COMMERCIAL

## 2021-06-22 DIAGNOSIS — D86.0: ICD-10-CM

## 2021-06-22 DIAGNOSIS — Z88.8: ICD-10-CM

## 2021-06-22 DIAGNOSIS — Z79.4: ICD-10-CM

## 2021-06-22 DIAGNOSIS — Z88.1: ICD-10-CM

## 2021-06-22 DIAGNOSIS — Z79.899: ICD-10-CM

## 2021-06-22 DIAGNOSIS — I11.9: Primary | ICD-10-CM

## 2021-06-22 DIAGNOSIS — F41.9: ICD-10-CM

## 2021-06-22 DIAGNOSIS — E11.9: ICD-10-CM

## 2021-07-14 ENCOUNTER — HOSPITAL ENCOUNTER (OUTPATIENT)
Dept: HOSPITAL 35 - SJCVC | Age: 76
End: 2021-07-14
Attending: INTERNAL MEDICINE
Payer: COMMERCIAL

## 2021-07-14 DIAGNOSIS — Z82.49: ICD-10-CM

## 2021-07-14 DIAGNOSIS — D86.0: ICD-10-CM

## 2021-07-14 DIAGNOSIS — E11.9: ICD-10-CM

## 2021-07-14 DIAGNOSIS — Z79.4: ICD-10-CM

## 2021-07-14 DIAGNOSIS — Z79.899: ICD-10-CM

## 2021-07-14 DIAGNOSIS — I11.9: Primary | ICD-10-CM

## 2021-07-14 DIAGNOSIS — Z88.8: ICD-10-CM

## 2021-07-14 DIAGNOSIS — Z79.82: ICD-10-CM

## 2021-07-19 ENCOUNTER — HOSPITAL ENCOUNTER (EMERGENCY)
Dept: HOSPITAL 35 - ER | Age: 76
LOS: 1 days | Discharge: HOME | End: 2021-07-20
Payer: COMMERCIAL

## 2021-07-19 VITALS — BODY MASS INDEX: 27.47 KG/M2 | HEIGHT: 67 IN | WEIGHT: 175 LBS

## 2021-07-19 DIAGNOSIS — Z20.822: ICD-10-CM

## 2021-07-19 DIAGNOSIS — J18.9: Primary | ICD-10-CM

## 2021-07-19 DIAGNOSIS — Z79.899: ICD-10-CM

## 2021-07-19 DIAGNOSIS — Z88.1: ICD-10-CM

## 2021-07-20 VITALS — SYSTOLIC BLOOD PRESSURE: 149 MMHG | DIASTOLIC BLOOD PRESSURE: 66 MMHG

## 2021-07-20 LAB
ALBUMIN SERPL-MCNC: 3.2 G/DL (ref 3.4–5)
ALT SERPL-CCNC: 21 U/L (ref 14–59)
ANION GAP SERPL CALC-SCNC: 4 MMOL/L (ref 7–16)
AST SERPL-CCNC: 15 U/L (ref 15–37)
BASOPHILS NFR BLD AUTO: 0.5 % (ref 0–2)
BILIRUB SERPL-MCNC: 0.5 MG/DL (ref 0.2–1)
BUN SERPL-MCNC: 19 MG/DL (ref 7–18)
CALCIUM SERPL-MCNC: 8.7 MG/DL (ref 8.5–10.1)
CHLORIDE SERPL-SCNC: 107 MMOL/L (ref 98–107)
CO2 SERPL-SCNC: 31 MMOL/L (ref 21–32)
CREAT SERPL-MCNC: 1.3 MG/DL (ref 0.6–1)
EOSINOPHIL NFR BLD: 0.3 % (ref 0–3)
ERYTHROCYTE [DISTWIDTH] IN BLOOD BY AUTOMATED COUNT: 15.1 % (ref 10.5–14.5)
GLUCOSE SERPL-MCNC: 116 MG/DL (ref 74–106)
GRANULOCYTES NFR BLD MANUAL: 79.8 % (ref 36–66)
HCT VFR BLD CALC: 28 % (ref 37–47)
HGB BLD-MCNC: 9.5 GM/DL (ref 12–15)
LYMPHOCYTES NFR BLD AUTO: 11.3 % (ref 24–44)
MCH RBC QN AUTO: 27.3 PG (ref 26–34)
MCHC RBC AUTO-ENTMCNC: 34 G/DL (ref 28–37)
MCV RBC: 80.3 FL (ref 80–100)
MONOCYTES NFR BLD: 8.1 % (ref 1–8)
NEUTROPHILS # BLD: 5.7 THOU/UL (ref 1.4–8.2)
PLATELET # BLD: 202 THOU/UL (ref 150–400)
POTASSIUM SERPL-SCNC: 3.9 MMOL/L (ref 3.5–5.1)
PROT SERPL-MCNC: 6.9 G/DL (ref 6.4–8.2)
RBC # BLD AUTO: 3.49 MIL/UL (ref 4.2–5)
SODIUM SERPL-SCNC: 142 MMOL/L (ref 136–145)
TROPONIN I SERPL-MCNC: <0.06 NG/ML (ref ?–0.06)
WBC # BLD AUTO: 7.2 THOU/UL (ref 4–11)

## 2021-07-20 NOTE — EKG
[place] [address] [facility]
[place]  [address]
Phone:  [phone]                    ELECTROCARDIOGRAM REPORT      
_______________________________________________________________________________
 
Name:       FANTADEEPTHI ELIZABETH               [facility]                     [facility]      [id number]     Account #:      [id number]  
Admission:  21    Attend Phys:                          
Discharge:  21    Date of Birth:  [birth year]  
                                                          Report #: [id number]
   [id number]
_______________________________________________________________________________
                         [facility] ED
                                       
Test Date:    2021               Test Time:    00:23:41
Pat Name:     DEEPTHI JEWELL            Department:   
Patient ID:   [id number]            Room:          
Gender:       F                        Technician:   NOE
:          1945               Requested By: Eric Rodríguez
Order Number: [id number] MD:   Yared Streeter
                                 Measurements
Intervals                              Axis          
Rate:         55                       P:            229
PA:           227                      QRS:          222
QRSD:         118                      T:            192
QT:           438                                    
QTc:          419                                    
                           Interpretive Statements
Sinus rhythm
Prolonged PA interval
Nonspecific intraventricular conduction delay
Nonspecific T abnormalities, inferior leads
Baseline wander in lead(s) V1,V2
Compared to ECG 2021 11:21:48
Ectopic atrial rhythm now present
Sinus bradycardia no longer present
T-wave abnormality still present
Electronically Signed On 2021 7:40:12 CDT by Yared Streeter
[email or web address]
 
 
 
 
 
 
 
 
 
 
 
 
 
 
 
 
  <ELECTRONICALLY SIGNED>
   By: Yared Streeter MD, [facility]    
  21     0740
D: 21 0023                           _____________________________________
T: 21 0023                           Yared Streeter MD, [facility]      /EPI

## 2021-07-26 ENCOUNTER — HOSPITAL ENCOUNTER (OUTPATIENT)
Dept: HOSPITAL 35 - RAD | Age: 76
End: 2021-07-26
Attending: INTERNAL MEDICINE
Payer: COMMERCIAL

## 2021-07-26 DIAGNOSIS — R91.8: Primary | ICD-10-CM

## 2021-07-26 DIAGNOSIS — I51.7: ICD-10-CM

## 2021-07-26 DIAGNOSIS — J18.9: ICD-10-CM

## 2021-07-31 ENCOUNTER — HOSPITAL ENCOUNTER (INPATIENT)
Dept: HOSPITAL 35 - ER | Age: 76
LOS: 4 days | Discharge: HOME | DRG: 682 | End: 2021-08-04
Attending: HOSPITALIST | Admitting: HOSPITALIST
Payer: COMMERCIAL

## 2021-07-31 VITALS — SYSTOLIC BLOOD PRESSURE: 170 MMHG | DIASTOLIC BLOOD PRESSURE: 74 MMHG

## 2021-07-31 VITALS — DIASTOLIC BLOOD PRESSURE: 74 MMHG | SYSTOLIC BLOOD PRESSURE: 175 MMHG

## 2021-07-31 VITALS — SYSTOLIC BLOOD PRESSURE: 159 MMHG | DIASTOLIC BLOOD PRESSURE: 87 MMHG

## 2021-07-31 VITALS — HEIGHT: 67.01 IN | WEIGHT: 174.4 LBS | BODY MASS INDEX: 27.37 KG/M2

## 2021-07-31 VITALS — SYSTOLIC BLOOD PRESSURE: 176 MMHG | DIASTOLIC BLOOD PRESSURE: 80 MMHG

## 2021-07-31 DIAGNOSIS — I10: ICD-10-CM

## 2021-07-31 DIAGNOSIS — J44.1: ICD-10-CM

## 2021-07-31 DIAGNOSIS — D86.9: ICD-10-CM

## 2021-07-31 DIAGNOSIS — Z88.1: ICD-10-CM

## 2021-07-31 DIAGNOSIS — R53.81: ICD-10-CM

## 2021-07-31 DIAGNOSIS — N17.9: Primary | ICD-10-CM

## 2021-07-31 DIAGNOSIS — K21.9: ICD-10-CM

## 2021-07-31 DIAGNOSIS — F41.9: ICD-10-CM

## 2021-07-31 DIAGNOSIS — Z20.822: ICD-10-CM

## 2021-07-31 DIAGNOSIS — J18.9: ICD-10-CM

## 2021-07-31 DIAGNOSIS — Z79.899: ICD-10-CM

## 2021-07-31 DIAGNOSIS — J44.0: ICD-10-CM

## 2021-07-31 DIAGNOSIS — Z88.8: ICD-10-CM

## 2021-07-31 LAB
ALBUMIN SERPL-MCNC: 3.4 G/DL (ref 3.4–5)
ALT SERPL-CCNC: 13 U/L (ref 30–65)
ANION GAP SERPL CALC-SCNC: 7 MMOL/L (ref 7–16)
AST SERPL-CCNC: 15 U/L (ref 15–37)
BILIRUB SERPL-MCNC: 0.6 MG/DL (ref 0.2–1)
BILIRUB UR-MCNC: NEGATIVE MG/DL
BUN SERPL-MCNC: 15 MG/DL (ref 7–18)
CALCIUM SERPL-MCNC: 8.9 MG/DL (ref 8.5–10.1)
CHLORIDE SERPL-SCNC: 109 MMOL/L (ref 98–107)
CO2 SERPL-SCNC: 30 MMOL/L (ref 21–32)
COLOR UR: YELLOW
CREAT SERPL-MCNC: 1.9 MG/DL (ref 0.6–1)
ERYTHROCYTE [DISTWIDTH] IN BLOOD BY AUTOMATED COUNT: 15.2 % (ref 10.5–14.5)
GLUCOSE SERPL-MCNC: 141 MG/DL (ref 74–106)
HCT VFR BLD CALC: 29.9 % (ref 37–47)
HGB BLD-MCNC: 10 GM/DL (ref 12–15)
KETONES UR STRIP-MCNC: NEGATIVE MG/DL
MCH RBC QN AUTO: 26.5 PG (ref 26–34)
MCHC RBC AUTO-ENTMCNC: 33.6 G/DL (ref 28–37)
MCV RBC: 79 FL (ref 80–100)
MUCUS: (no result) STRN/LPF
PLATELET # BLD: 294 THOU/UL (ref 150–400)
POTASSIUM SERPL-SCNC: 4.4 MMOL/L (ref 3.5–5.1)
PROT SERPL-MCNC: 7.6 G/DL (ref 6.4–8.2)
RBC # BLD AUTO: 3.78 MIL/UL (ref 4.2–5)
RBC # UR STRIP: NEGATIVE /UL
SODIUM SERPL-SCNC: 146 MMOL/L (ref 136–145)
SP GR UR STRIP: >= 1.03 (ref 1–1.03)
SQUAMOUS: (no result) /LPF (ref 0–3)
TROPONIN I SERPL-MCNC: <0.06 NG/ML (ref ?–0.06)
URINE CLARITY: CLEAR
URINE GLUCOSE-RANDOM*: NEGATIVE
URINE LEUKOCYTES-REFLEX: NEGATIVE
URINE NITRITE-REFLEX: POSITIVE
URINE PROTEIN (DIPSTICK): NEGATIVE
URINE WBC-REFLEX: (no result) /HPF (ref 0–5)
UROBILINOGEN UR STRIP-ACNC: 0.2 E.U./DL (ref 0.2–1)
WBC # BLD AUTO: 6.3 THOU/UL (ref 4–11)

## 2021-07-31 PROCEDURE — 10040 EXTRACTION: CPT

## 2021-08-01 VITALS — DIASTOLIC BLOOD PRESSURE: 81 MMHG | SYSTOLIC BLOOD PRESSURE: 166 MMHG

## 2021-08-01 VITALS — DIASTOLIC BLOOD PRESSURE: 75 MMHG | SYSTOLIC BLOOD PRESSURE: 155 MMHG

## 2021-08-01 VITALS — DIASTOLIC BLOOD PRESSURE: 70 MMHG | SYSTOLIC BLOOD PRESSURE: 159 MMHG

## 2021-08-01 LAB
ANION GAP SERPL CALC-SCNC: 9 MMOL/L (ref 7–16)
BUN SERPL-MCNC: 16 MG/DL (ref 7–18)
CALCIUM SERPL-MCNC: 8.8 MG/DL (ref 8.5–10.1)
CHLORIDE SERPL-SCNC: 106 MMOL/L (ref 98–107)
CO2 SERPL-SCNC: 27 MMOL/L (ref 21–32)
CREAT SERPL-MCNC: 1.8 MG/DL (ref 0.6–1)
ERYTHROCYTE [DISTWIDTH] IN BLOOD BY AUTOMATED COUNT: 15.4 % (ref 10.5–14.5)
GLUCOSE SERPL-MCNC: 135 MG/DL (ref 74–106)
HCT VFR BLD CALC: 30.3 % (ref 37–47)
HGB BLD-MCNC: 10.1 GM/DL (ref 12–15)
MCH RBC QN AUTO: 26.7 PG (ref 26–34)
MCHC RBC AUTO-ENTMCNC: 33.2 G/DL (ref 28–37)
MCV RBC: 80.5 FL (ref 80–100)
PLATELET # BLD: 276 THOU/UL (ref 150–400)
POTASSIUM SERPL-SCNC: 3.7 MMOL/L (ref 3.5–5.1)
RBC # BLD AUTO: 3.77 MIL/UL (ref 4.2–5)
SODIUM SERPL-SCNC: 142 MMOL/L (ref 136–145)
WBC # BLD AUTO: 6.3 THOU/UL (ref 4–11)

## 2021-08-01 NOTE — NUR
ASSUMED PT CARE THIS AM. PT IS ALERT & ORIENTED X4 AND ANXIOUS AT TIMES. PT
HAS IV SITE ON L AC 22 GAUGE. PT IS UP WITH ASSIST WITH CANE TO THE BATHROOM.
PT IS ON ROOM AIR AND HAS BREATHING TREATMENT. PT TOLERATED MEDICATION AND
DIET WELL. NO C/O NAUSEA AND VOMITING DURING THE SHIFT. EDUCATED PT ABOUT BP
MEDICATION THIS AM. PT ON THE BED, BED ON THE LOWEST POSITION, SIDE RAILS UP,
CALL LIGHT WITHIN REACH. WILL CONTINUE TO MONITOR PT. FOLLOW POC.

## 2021-08-01 NOTE — EKG
[place] [address] [facility] [employer]
[place]  [address]
Phone:  [phone]                    ELECTROCARDIOGRAM REPORT      
_______________________________________________________________________________
 
Name:       FANTADEEPTHI CLARA               Room #:         449-I       ADM IN  
.R.#:      [id number]     Account #:      [id number]  
Admission:  21    Attend Phys:    Lindsay Ferguson MD  
Discharge:              Date of Birth:  [birth year]  
                                                          Report #: [id number]
   [id number]
_______________________________________________________________________________
                         [facility] ED
                                       
Test Date:    2021               Test Time:    19:23:00
Pat Name:     DEEPTHI JEWELL            Department:   
Patient ID:   [id number]            [facility]
Gender:       F                        Technician:   JCHAIREZ
:          1945               Requested By: Whit Crowder
Order Number: [id number] MD:   Yared Streeter
                                 Measurements
Intervals                              Axis          
Rate:         65                       P:            -62
NM:           232                      QRS:          -44
QRSD:         124                      T:            0
QT:           420                                    
QTc:          437                                    
                           Interpretive Statements
Sinus or ectopic atrial rhythm
Prolonged NM interval
Nonspecific IVCD with LAD
Left ventricular hypertrophy
Compared to ECG 2021 00:23:41
Ectopic atrial rhythm now present
Left ventricular hypertrophy now present
Sinus rhythm no longer present
T-wave abnormality no longer present
Electronically Signed On 2021 9:22:44 CDT by Yared Streeter
[email or web address]
 
 
 
 
 
 
 
 
 
 
 
 
 
 
 
 
  <ELECTRONICALLY SIGNED>
   By: Yared Streeter MD, FAC    
  21
D: 21                           _____________________________________
T: 21                           Yared Streeter MD, [facility]      /EPI

## 2021-08-01 NOTE — NUR
Pt admitted from ED @2300 with PNA. A/OX4,VSS. Denies pain on assessment but
complain of a burning sensation on under left breast from the PNA. Up with
SBA/cane,weak. Has dyspnea on exertion,cough. Skin intact. Continent of B&B.
Reports hasn't been taking most of her home meds d/t the PNA,provider
notified. Fall safety reinforced agrees to call for help as needed.

## 2021-08-02 VITALS — SYSTOLIC BLOOD PRESSURE: 152 MMHG | DIASTOLIC BLOOD PRESSURE: 64 MMHG

## 2021-08-02 VITALS — DIASTOLIC BLOOD PRESSURE: 73 MMHG | SYSTOLIC BLOOD PRESSURE: 164 MMHG

## 2021-08-02 VITALS — DIASTOLIC BLOOD PRESSURE: 67 MMHG | SYSTOLIC BLOOD PRESSURE: 161 MMHG

## 2021-08-02 LAB
ANION GAP SERPL CALC-SCNC: 11 MMOL/L (ref 7–16)
BASOPHILS NFR BLD AUTO: 0.3 % (ref 0–2)
BUN SERPL-MCNC: 22 MG/DL (ref 7–18)
CALCIUM SERPL-MCNC: 8.9 MG/DL (ref 8.5–10.1)
CHLORIDE SERPL-SCNC: 104 MMOL/L (ref 98–107)
CO2 SERPL-SCNC: 23 MMOL/L (ref 21–32)
CREAT SERPL-MCNC: 2 MG/DL (ref 0.6–1)
EOSINOPHIL NFR BLD: 0.3 % (ref 0–3)
ERYTHROCYTE [DISTWIDTH] IN BLOOD BY AUTOMATED COUNT: 15 % (ref 10.5–14.5)
ERYTHROCYTE [DISTWIDTH] IN BLOOD BY AUTOMATED COUNT: 15.3 % (ref 10.5–14.5)
EST. AVERAGE GLUCOSE BLD GHB EST-MCNC: 126 MG/DL
GLUCOSE SERPL-MCNC: 205 MG/DL (ref 74–106)
GLYCOHEMOGLOBIN (HGB A1C): 6 % (ref 4.8–5.6)
GRANULOCYTES NFR BLD MANUAL: 93.5 % (ref 36–66)
HCT VFR BLD CALC: 22.8 % (ref 37–47)
HCT VFR BLD CALC: 28.9 % (ref 37–47)
HGB BLD-MCNC: 7.4 GM/DL (ref 12–15)
HGB BLD-MCNC: 9.3 GM/DL (ref 12–15)
LYMPHOCYTES NFR BLD AUTO: 4.2 % (ref 24–44)
MCH RBC QN AUTO: 25.8 PG (ref 26–34)
MCH RBC QN AUTO: 25.9 PG (ref 26–34)
MCHC RBC AUTO-ENTMCNC: 32.2 G/DL (ref 28–37)
MCHC RBC AUTO-ENTMCNC: 32.6 G/DL (ref 28–37)
MCV RBC: 79.2 FL (ref 80–100)
MCV RBC: 80.5 FL (ref 80–100)
MONOCYTES NFR BLD: 1.7 % (ref 1–8)
NEUTROPHILS # BLD: 11.8 THOU/UL (ref 1.4–8.2)
PLATELET # BLD: 235 THOU/UL (ref 150–400)
PLATELET # BLD: 283 THOU/UL (ref 150–400)
POTASSIUM SERPL-SCNC: 3.9 MMOL/L (ref 3.5–5.1)
RBC # BLD AUTO: 2.88 MIL/UL (ref 4.2–5)
RBC # BLD AUTO: 3.59 MIL/UL (ref 4.2–5)
SODIUM SERPL-SCNC: 138 MMOL/L (ref 136–145)
WBC # BLD AUTO: 12.6 THOU/UL (ref 4–11)
WBC # BLD AUTO: 12.7 THOU/UL (ref 4–11)

## 2021-08-02 NOTE — NUR
ORDERS RECEIVED FOR PT EVAL AND TREAT. Pt ADMITTED FOR CAP W/ FAILED OP
TREATMENT AND JOVANA. Pt LIVES ALONE, 5 ZACHERY W/ HR, USES CANE. Pt REFUSING TO WORK
W/ PT. STATED THAT SHE FELL AT HER LAST ADMISSION W/ 'TWO MEN.' STATED SHE
LIKES WOMEN AND THAT THIS PT HAS BEEN GREAT BUT STILL REFUSING FORMAL PT EVAL.
STATED SHE WALKS BY HERSELF BUT THAT SHE DOES GET A LITTLE DIZZY. TRANSPORT
PRESENT TO TAKE Pt TO XRAY. Pt NOTED TO BE INDEP W/ BED MOB AND SIT>STAND. AMB
TO BATHROOM W/ HER CANE AND PURSE ON HER SHOULDER W/O ISSUES NOTED. UP TO W/C
W/ TRANSPORT STAFF TO GO TO XRAY. NOTIFIED RN ABOUT MOBILITY THAT WAS
OBSERVED. IF MOBILITY STATUS CHANGES, PLEASE CONSIDER RE-CONSULTING PT
SERVICES. BUT AT THIS TIME, PT TO SIGN OFF D/T Pt REFUSAL.

## 2021-08-02 NOTE — NUR
Assumed pt care at 1900. A/OX4,VSS.Denies N/V,some tenderness on left lower
chest. Up with SBA. Had questions regarding antibiotics dozing,explained the
frequency of antibiotics,verbalized understanding with no further questions.
Fall precautions in place,reminded to call as needed for help.

## 2021-08-02 NOTE — NUR
ASSUMED PT CARE THIS AM. PT A&OX4, ABLE TO MAKE NEEDS KNOWN. PATIENT HAS NO
COMPLAINTS OF PAIN. PATIENT UP WITH ASSIST AROUND ROOM. PATIENT REMAINS
CONTINENT. HAS A DRY COUGH. PATIENT REPORTS NON UMBNESS OR TINGLING.
MEDICATIONS TAKEN WITHOUT ISSUE. IV PATENT, SALINE LOCKED. FALL PRECAUTIONS
ARE IN PLACE, CALL LIGHT WITHIN REACH.

## 2021-08-03 VITALS — DIASTOLIC BLOOD PRESSURE: 72 MMHG | SYSTOLIC BLOOD PRESSURE: 143 MMHG

## 2021-08-03 VITALS — SYSTOLIC BLOOD PRESSURE: 146 MMHG | DIASTOLIC BLOOD PRESSURE: 61 MMHG

## 2021-08-03 VITALS — DIASTOLIC BLOOD PRESSURE: 63 MMHG | SYSTOLIC BLOOD PRESSURE: 152 MMHG

## 2021-08-03 LAB
ANION GAP SERPL CALC-SCNC: 9 MMOL/L (ref 7–16)
BUN SERPL-MCNC: 33 MG/DL (ref 7–18)
CALCIUM SERPL-MCNC: 8.4 MG/DL (ref 8.5–10.1)
CHLORIDE SERPL-SCNC: 107 MMOL/L (ref 98–107)
CO2 SERPL-SCNC: 25 MMOL/L (ref 21–32)
CREAT SERPL-MCNC: 1.9 MG/DL (ref 0.6–1)
ERYTHROCYTE [DISTWIDTH] IN BLOOD BY AUTOMATED COUNT: 15.2 % (ref 10.5–14.5)
GLUCOSE SERPL-MCNC: 157 MG/DL (ref 74–106)
HCT VFR BLD CALC: 26.8 % (ref 37–47)
HGB BLD-MCNC: 9 GM/DL (ref 12–15)
MCH RBC QN AUTO: 26.6 PG (ref 26–34)
MCHC RBC AUTO-ENTMCNC: 33.6 G/DL (ref 28–37)
MCV RBC: 79.1 FL (ref 80–100)
PLATELET # BLD: 271 THOU/UL (ref 150–400)
POTASSIUM SERPL-SCNC: 4.4 MMOL/L (ref 3.5–5.1)
RBC # BLD AUTO: 3.4 MIL/UL (ref 4.2–5)
SODIUM SERPL-SCNC: 141 MMOL/L (ref 136–145)
WBC # BLD AUTO: 14.1 THOU/UL (ref 4–11)

## 2021-08-03 NOTE — NUR
ASSUMED PT CARE THIS AM. PT A&OX4, ABLE TO MAKE NEEDS KNOWN. IV PATENT,
MEDICATIONS GIVEN PO AND IV WITHOUT ISSUE. PATIENT REMAINS CONTINENT, UP TO
BATHROOM WITH ASSIST. PATIENT IS ON ROOM AIR. FALL PRECAUTIONS ARE IN PLACE.
CALL LIGHT WITHIN REACH. PATIENT DENIES PAIN, NUMBNESS, OR TINGLING.

## 2021-08-03 NOTE — NUR
Assumed pt care at 1900. A/OX4,VSS. Denies pain on assessment. Up with
SBA/cane. Does have a non productive,PO intake encouraged. Took all meds as
ordered. IV infiltrated on LAC,24 gauge reinserted on right wrist with 2
attempts and Abts infused. Continent of B&B. Resting quietly at this time w/o
any distress noted.

## 2021-08-04 VITALS — DIASTOLIC BLOOD PRESSURE: 76 MMHG | SYSTOLIC BLOOD PRESSURE: 168 MMHG

## 2021-08-04 VITALS — SYSTOLIC BLOOD PRESSURE: 168 MMHG | DIASTOLIC BLOOD PRESSURE: 76 MMHG

## 2021-08-04 VITALS — DIASTOLIC BLOOD PRESSURE: 56 MMHG | SYSTOLIC BLOOD PRESSURE: 146 MMHG

## 2021-08-04 LAB
ANION GAP SERPL CALC-SCNC: 8 MMOL/L (ref 7–16)
BUN SERPL-MCNC: 35 MG/DL (ref 7–18)
CALCIUM SERPL-MCNC: 8.4 MG/DL (ref 8.5–10.1)
CHLORIDE SERPL-SCNC: 104 MMOL/L (ref 98–107)
CO2 SERPL-SCNC: 26 MMOL/L (ref 21–32)
CREAT SERPL-MCNC: 1.9 MG/DL (ref 0.6–1)
ERYTHROCYTE [DISTWIDTH] IN BLOOD BY AUTOMATED COUNT: 15.2 % (ref 10.5–14.5)
GLUCOSE SERPL-MCNC: 195 MG/DL (ref 74–106)
HCT VFR BLD CALC: 27.8 % (ref 37–47)
HGB BLD-MCNC: 9.4 GM/DL (ref 12–15)
MCH RBC QN AUTO: 26.6 PG (ref 26–34)
MCHC RBC AUTO-ENTMCNC: 33.8 G/DL (ref 28–37)
MCV RBC: 78.7 FL (ref 80–100)
PLATELET # BLD: 288 THOU/UL (ref 150–400)
POTASSIUM SERPL-SCNC: 3.9 MMOL/L (ref 3.5–5.1)
RBC # BLD AUTO: 3.53 MIL/UL (ref 4.2–5)
SODIUM SERPL-SCNC: 138 MMOL/L (ref 136–145)
WBC # BLD AUTO: 9.8 THOU/UL (ref 4–11)

## 2021-08-04 NOTE — NUR
Assumed pt's care this pm shift. Alert and oriented. VSS on RA. Meds given per
emar. Pt denied insulin at HS with . Education provided on ACHS bg check
and SS. Pt asking if she's undergoing any procedures today. None seen in the
plan at this time. Will update pt if new plan available. Fall precaution in
place. Call light within reach. Will continue to monitor.

## 2021-08-04 NOTE — NUR
A/O x 4. Room air. Stand by assist. She uses her cane. No current complaints
of pain. Right wrist IV patent and flushes well.

## 2021-08-04 NOTE — NUR
Case opened to follow for dc planning needs. Case discussed with the care
team. Writer visited with the pt at bedside. She is a&ox4 and indicates she
lives with her dtr/son david and grandsons. They all help take care of each
other. She drives and is indep with gait/adl's and IADLS. She reports that she
does the shopping and laundry. Her son is law is having serious health issues
right now and her dtr is unemployed. Her grandsons are home from college for
another month. She is reports hx of sarcoidosis and uses a cane for short
distance gait. She has to pace her self for activities but she remains
motivated and indep. She may be dcing home later today with outpt f/u. Pt has
fungal infections/pneumonia and is feeling better. F/u care per pulmonary. The
pt reports mutliple sets of stairs at home as well but is able to manage them
on her own. She denies any dc concerns or needs at this time.

## 2021-08-16 ENCOUNTER — HOSPITAL ENCOUNTER (INPATIENT)
Dept: HOSPITAL 35 - ER | Age: 76
LOS: 11 days | Discharge: HOME | DRG: 871 | End: 2021-08-27
Attending: INTERNAL MEDICINE | Admitting: INTERNAL MEDICINE
Payer: COMMERCIAL

## 2021-08-16 VITALS — DIASTOLIC BLOOD PRESSURE: 62 MMHG | SYSTOLIC BLOOD PRESSURE: 146 MMHG

## 2021-08-16 VITALS — SYSTOLIC BLOOD PRESSURE: 173 MMHG | DIASTOLIC BLOOD PRESSURE: 83 MMHG

## 2021-08-16 VITALS — WEIGHT: 202.6 LBS | HEIGHT: 67.01 IN | BODY MASS INDEX: 31.8 KG/M2

## 2021-08-16 VITALS — SYSTOLIC BLOOD PRESSURE: 152 MMHG | DIASTOLIC BLOOD PRESSURE: 69 MMHG

## 2021-08-16 DIAGNOSIS — G92: ICD-10-CM

## 2021-08-16 DIAGNOSIS — Z79.82: ICD-10-CM

## 2021-08-16 DIAGNOSIS — A41.9: Primary | ICD-10-CM

## 2021-08-16 DIAGNOSIS — E83.51: ICD-10-CM

## 2021-08-16 DIAGNOSIS — F41.1: ICD-10-CM

## 2021-08-16 DIAGNOSIS — Z82.49: ICD-10-CM

## 2021-08-16 DIAGNOSIS — Z20.822: ICD-10-CM

## 2021-08-16 DIAGNOSIS — R04.2: ICD-10-CM

## 2021-08-16 DIAGNOSIS — R77.8: ICD-10-CM

## 2021-08-16 DIAGNOSIS — J96.21: ICD-10-CM

## 2021-08-16 DIAGNOSIS — Z91.19: ICD-10-CM

## 2021-08-16 DIAGNOSIS — I12.9: ICD-10-CM

## 2021-08-16 DIAGNOSIS — R53.81: ICD-10-CM

## 2021-08-16 DIAGNOSIS — D64.9: ICD-10-CM

## 2021-08-16 DIAGNOSIS — J84.10: ICD-10-CM

## 2021-08-16 DIAGNOSIS — R73.9: ICD-10-CM

## 2021-08-16 DIAGNOSIS — J18.9: ICD-10-CM

## 2021-08-16 DIAGNOSIS — Z79.899: ICD-10-CM

## 2021-08-16 DIAGNOSIS — Z88.8: ICD-10-CM

## 2021-08-16 DIAGNOSIS — D86.9: ICD-10-CM

## 2021-08-16 DIAGNOSIS — N17.9: ICD-10-CM

## 2021-08-16 DIAGNOSIS — J44.0: ICD-10-CM

## 2021-08-16 DIAGNOSIS — N18.30: ICD-10-CM

## 2021-08-16 DIAGNOSIS — R41.0: ICD-10-CM

## 2021-08-16 DIAGNOSIS — Z86.16: ICD-10-CM

## 2021-08-16 DIAGNOSIS — J44.1: ICD-10-CM

## 2021-08-16 LAB
ALBUMIN SERPL-MCNC: 2.8 G/DL (ref 3.4–5)
ALT SERPL-CCNC: 16 U/L (ref 30–65)
ANION GAP SERPL CALC-SCNC: 6 MMOL/L (ref 7–16)
APTT BLD: 27.6 SECONDS (ref 24.5–32.8)
AST SERPL-CCNC: 18 U/L (ref 15–37)
BASOPHILS NFR BLD AUTO: 0.5 % (ref 0–2)
BILIRUB SERPL-MCNC: 0.8 MG/DL (ref 0.2–1)
BUN SERPL-MCNC: 13 MG/DL (ref 7–18)
CALCIUM SERPL-MCNC: 8.2 MG/DL (ref 8.5–10.1)
CHLORIDE SERPL-SCNC: 106 MMOL/L (ref 98–107)
CO2 SERPL-SCNC: 30 MMOL/L (ref 21–32)
CREAT SERPL-MCNC: 1.6 MG/DL (ref 0.6–1)
EOSINOPHIL NFR BLD: 3 % (ref 0–3)
ERYTHROCYTE [DISTWIDTH] IN BLOOD BY AUTOMATED COUNT: 15.9 % (ref 10.5–14.5)
GLUCOSE SERPL-MCNC: 107 MG/DL (ref 74–106)
GRANULOCYTES NFR BLD MANUAL: 67.3 % (ref 36–66)
HCT VFR BLD CALC: 27.3 % (ref 37–47)
HGB BLD-MCNC: 8.8 GM/DL (ref 12–15)
INR PPP: 0.95
LYMPHOCYTES NFR BLD AUTO: 15.2 % (ref 24–44)
MCH RBC QN AUTO: 26.2 PG (ref 26–34)
MCHC RBC AUTO-ENTMCNC: 32.4 G/DL (ref 28–37)
MCV RBC: 81 FL (ref 80–100)
MONOCYTES NFR BLD: 14 % (ref 1–8)
NEUTROPHILS # BLD: 4.6 THOU/UL (ref 1.4–8.2)
PLATELET # BLD: 184 THOU/UL (ref 150–400)
POTASSIUM SERPL-SCNC: 3.6 MMOL/L (ref 3.5–5.1)
PROT SERPL-MCNC: 6.5 G/DL (ref 6.4–8.2)
PROTHROMBIN TIME: 10.4 SECONDS (ref 10.5–12.1)
RBC # BLD AUTO: 3.37 MIL/UL (ref 4.2–5)
SODIUM SERPL-SCNC: 142 MMOL/L (ref 136–145)
TROPONIN I SERPL-MCNC: 0.11 NG/ML (ref ?–0.06)
WBC # BLD AUTO: 6.8 THOU/UL (ref 4–11)

## 2021-08-16 PROCEDURE — 10045: CPT

## 2021-08-16 PROCEDURE — 10081 I&D PILONIDAL CYST COMP: CPT

## 2021-08-16 NOTE — NUR
75 year old female with a hx of sarcoidosis and COPD presenting to the ED on
8-16-21 with complaints of SOA.  ID NOW testing in the ED shows as negative.
Of note patient had and was treated in January of 2021 with Covid Pneumonia.
The patient reports vaccination with Moderna 3-11-21 and completed on 4-13-21.
 The patient reports that she was admitted 2 weeks ago for pneumonia.  She was
subsequently discharged on a 5 day course of antibiotics and steroids, which
she has completed.  Pt additionally reports a worsening cough with productive
sputum, subjective fever and chills, and progressively worsening generalized
weakness.  The patient has been admitted with exacerbation of interstitial
lung disease and acute/chronic respiratory failure / possible residual Covid
pneumonitis / possible HCAP.  The patient has been placed on IV Zosyn and IV
steroid with sputum culture and pulmonology has been contacted.
 
 
The patient was last seen by CM on 8-4-21 and discharged to home with her
daughter and son in law with their children as she was independent with gait
and ADL's.  At this admission the patient was treated for fungal and
infectious pneumonia.  A per last admission the daughter Rebecca Tran remains
as the next of kin at [phone].  The patient's PCP is Dr. Love.  Per ED
assessment and MD documentation the patient remains A&O x4.  After MD plans of
care and treatment plan are established and therapy evaluated; that CM will
review for discharge needs.  CM will continue to follow.

## 2021-08-16 NOTE — EKG
[address] [address] [facility] [address]
[place]  [address]
Phone:  [phone]                    ELECTROCARDIOGRAM REPORT      
_______________________________________________________________________________
 
Name:       DEEPTHI JEWELL               [facility] #[facility]                     [facility]      [id number]     Account #:      [id number]  
Admission:  21    Attend Phys:                          
Discharge:              Date of Birth:  [birth year]  
                                                          Report #: [id number]
   [id number]
_______________________________________________________________________________
                         [facility] ED
                                       
Test Date:    2021               Test Time:    10:40:04
Pat Name:     DEEPTHI JEWELL            Department:   
Patient ID:   [id number]            Room:          
Gender:       F                        Technician:   SIMONA
:          1945               Requested By: Dax Levine
Order Number: [id number] MD:   Yared Streeter
                                 Measurements
Intervals                              Axis          
Rate:         75                       P:            -59
PA:           201                      QRS:          -47
QRSD:         116                      T:            43
QT:           406                                    
QTc:          454                                    
                           Interpretive Statements
NSR
LAD, consider left anterior fascicular block
Left ventricular hypertrophy
Compared to ECG 2021 19:23:00
First degree AV block no longer present
Intraventricular conduction delay no longer present
Electronically Signed On 2021 12:52:18 CDT by Yared Streeter
[email or web address]
 
 
 
 
 
 
 
 
 
 
 
 
 
 
 
 
 
 
 
  <ELECTRONICALLY SIGNED>
   By: Yared Streeter MD, [facility]    
  21     1252
D: 21 1040                           _____________________________________
T: 21 1040                           Yared Streeter MD, FACC      /EPI

## 2021-08-17 VITALS — SYSTOLIC BLOOD PRESSURE: 168 MMHG | DIASTOLIC BLOOD PRESSURE: 78 MMHG

## 2021-08-17 VITALS — SYSTOLIC BLOOD PRESSURE: 119 MMHG | DIASTOLIC BLOOD PRESSURE: 74 MMHG

## 2021-08-17 VITALS — SYSTOLIC BLOOD PRESSURE: 194 MMHG | DIASTOLIC BLOOD PRESSURE: 74 MMHG

## 2021-08-17 VITALS — SYSTOLIC BLOOD PRESSURE: 145 MMHG | DIASTOLIC BLOOD PRESSURE: 74 MMHG

## 2021-08-17 VITALS — SYSTOLIC BLOOD PRESSURE: 174 MMHG | DIASTOLIC BLOOD PRESSURE: 67 MMHG

## 2021-08-17 LAB
ANION GAP SERPL CALC-SCNC: 13 MMOL/L (ref 7–16)
BE(VIVO): 0.5 MMOL/L
BUN SERPL-MCNC: 21 MG/DL (ref 7–18)
CALCIUM SERPL-MCNC: 7.9 MG/DL (ref 8.5–10.1)
CHLORIDE SERPL-SCNC: 107 MMOL/L (ref 98–107)
CO2 SERPL-SCNC: 24 MMOL/L (ref 21–32)
CREAT SERPL-MCNC: 2.1 MG/DL (ref 0.6–1)
ERYTHROCYTE [DISTWIDTH] IN BLOOD BY AUTOMATED COUNT: 15.7 % (ref 10.5–14.5)
GLUCOSE SERPL-MCNC: 392 MG/DL (ref 74–106)
HCO3 BLD-SCNC: 27.2 MMOL/L (ref 22–26)
HCT VFR BLD CALC: 25.8 % (ref 37–47)
HGB BLD-MCNC: 8.5 GM/DL (ref 12–15)
MCH RBC QN AUTO: 26.8 PG (ref 26–34)
MCHC RBC AUTO-ENTMCNC: 32.9 G/DL (ref 28–37)
MCV RBC: 81.5 FL (ref 80–100)
PCO2 BLD: 53.8 MMHG (ref 35–45)
PLATELET # BLD: 146 THOU/UL (ref 150–400)
PO2 BLD: 72.8 MMHG (ref 80–100)
POTASSIUM SERPL-SCNC: 3.6 MMOL/L (ref 3.5–5.1)
RBC # BLD AUTO: 3.17 MIL/UL (ref 4.2–5)
SODIUM SERPL-SCNC: 144 MMOL/L (ref 136–145)
WBC # BLD AUTO: 4 THOU/UL (ref 4–11)

## 2021-08-17 NOTE — NUR
PT AWOKE FROM SLEEP AND C/O
SOA WITH SIGNIFICANT WHEEZING NOTED. SPO2 >92% ON 2L NC.
CALLED NP ON CALL FOR HOSPITALIST. ORDER RECEIVED FROM BRITTA COKER NP, FOR
PRN BREATHING TREATMENT. RT NOTIFIED.

## 2021-08-17 NOTE — NUR
PT TO THEUNIT FROM THE ED.  ORIENTED TO ROOM AND BEDSPACE.  CINDY DIET AND
FLUIDS.  NO CO'S OF NAUSEA.  CO'S OF HEACH DUE TO COUGHING -  NO MED AVIALABLE
DR NELSY VALLEJO FOR MEDICATION.  ASSESSMENT AS CHARTED -  CARE PLAN INITIATED.
MEDS AS PER MAR -   CONSULTS NOTED.  NO CO'S AT THE PRESENT TIME.

## 2021-08-17 NOTE — NUR
PT SLEPT MOST OF THE NIGHT. SHE DOES GET SOA W/ EXERTION. SPO2 STABLE ON 2L NC.
BREATHING TX ADMINISTERED BY RT. IVF AND ABX ADMINISTERED AS ORDERED. SHE
DENIED ANY PAIN DURING THE NIGHT. STILL IN ER, WAITING FOR INPATIENT BED. WILL
MONITOR FURTHER.

## 2021-08-18 VITALS — DIASTOLIC BLOOD PRESSURE: 80 MMHG | SYSTOLIC BLOOD PRESSURE: 155 MMHG

## 2021-08-18 VITALS — SYSTOLIC BLOOD PRESSURE: 197 MMHG | DIASTOLIC BLOOD PRESSURE: 97 MMHG

## 2021-08-18 VITALS — SYSTOLIC BLOOD PRESSURE: 227 MMHG | DIASTOLIC BLOOD PRESSURE: 113 MMHG

## 2021-08-18 VITALS — SYSTOLIC BLOOD PRESSURE: 179 MMHG | DIASTOLIC BLOOD PRESSURE: 69 MMHG

## 2021-08-18 VITALS — DIASTOLIC BLOOD PRESSURE: 95 MMHG | SYSTOLIC BLOOD PRESSURE: 193 MMHG

## 2021-08-18 VITALS — DIASTOLIC BLOOD PRESSURE: 92 MMHG | SYSTOLIC BLOOD PRESSURE: 144 MMHG

## 2021-08-18 VITALS — SYSTOLIC BLOOD PRESSURE: 176 MMHG | DIASTOLIC BLOOD PRESSURE: 85 MMHG

## 2021-08-18 VITALS — DIASTOLIC BLOOD PRESSURE: 89 MMHG | SYSTOLIC BLOOD PRESSURE: 191 MMHG

## 2021-08-18 VITALS — SYSTOLIC BLOOD PRESSURE: 188 MMHG | DIASTOLIC BLOOD PRESSURE: 96 MMHG

## 2021-08-18 VITALS — DIASTOLIC BLOOD PRESSURE: 97 MMHG | SYSTOLIC BLOOD PRESSURE: 197 MMHG

## 2021-08-18 LAB
BASOPHILS NFR BLD AUTO: 0.5 % (ref 0–2)
BE(VIVO): 0.4 MMOL/L
BE(VIVO): 3.2 MMOL/L
EOSINOPHIL NFR BLD: 0 % (ref 0–3)
ERYTHROCYTE [DISTWIDTH] IN BLOOD BY AUTOMATED COUNT: 15.7 % (ref 10.5–14.5)
GRANULOCYTES NFR BLD MANUAL: 91.5 % (ref 36–66)
HCO3 BLD-SCNC: 28.4 MMOL/L (ref 22–26)
HCO3 BLD-SCNC: 29.4 MMOL/L (ref 22–26)
HCT VFR BLD CALC: 26.3 % (ref 37–47)
HGB BLD-MCNC: 8.4 GM/DL (ref 12–15)
LYMPHOCYTES NFR BLD AUTO: 3.5 % (ref 24–44)
MCH RBC QN AUTO: 26 PG (ref 26–34)
MCHC RBC AUTO-ENTMCNC: 32 G/DL (ref 28–37)
MCV RBC: 81.3 FL (ref 80–100)
MONOCYTES NFR BLD: 4.5 % (ref 1–8)
NEUTROPHILS # BLD: 11.3 THOU/UL (ref 1.4–8.2)
PCO2 BLD: 53.1 MMHG (ref 35–45)
PCO2 BLD: 65.2 MMHG (ref 35–45)
PLATELET # BLD: 159 THOU/UL (ref 150–400)
PO2 BLD: 62.5 MMHG (ref 80–100)
PO2 BLD: 73.1 MMHG (ref 80–100)
RBC # BLD AUTO: 3.24 MIL/UL (ref 4.2–5)
WBC # BLD AUTO: 12.3 THOU/UL (ref 4–11)

## 2021-08-18 PROCEDURE — 5A09357 ASSISTANCE WITH RESPIRATORY VENTILATION, LESS THAN 24 CONSECUTIVE HOURS, CONTINUOUS POSITIVE AIRWAY PRESSURE: ICD-10-PCS | Performed by: INTERNAL MEDICINE

## 2021-08-18 NOTE — EKG
[address] [employer]
[place]  [address]
Phone:  [phone]                    ELECTROCARDIOGRAM REPORT      
_______________________________________________________________________________
 
Name:       DAVID JEWELLOTHY CLARA               Room #:         204-P       ADM IN  
M.R.#:      [id number]     Account #:      [id number]  
Admission:  21    Attend Phys:    Yang Messina MD      
Discharge:              Date of Birth:  [birth year]  
                                                          Report #: [id number]
   [id number]
_______________________________________________________________________________
                          [facility]
                                       
Test Date:    2021               Test Time:    08:45:55
Pat Name:     DEEPTHI JEWELL            Department:   
Patient ID:   [id number]            Room:         204 P
Gender:       F                        Technician:   NEHA
:          1945               Requested By: Yang Messina
Order Number: [id number] MD:   Yared Streeter
                                 Measurements
Intervals                              Axis          
Rate:         90                       P:            62
MO:           214                      QRS:          -33
QRSD:         113                      T:            37
QT:           371                                    
QTc:          454                                    
                           Interpretive Statements
Sinus rhythm
Borderline prolonged MO interval
Abnormal R-wave progression, late transition
Left ventricular hypertrophy
Baseline wander in lead(s) II,III,aVR,aVL,aVF,V1,V2,V3,V4
Compared to ECG 2021 10:40:04
No significant changes
Electronically Signed On 2021 12:36:09 CDT by Yared Streeter
[email or web address]
 
 
 
 
 
 
 
 
 
 
 
 
 
 
 
 
 
 
  <ELECTRONICALLY SIGNED>
   By: Yared Streeter MD, [facility]    
  21     1236
D: 21 0845                           _____________________________________
T: 21 0845                           Yared Streeter MD, [facility]      /EPI

## 2021-08-18 NOTE — NUR
AT 0530 PATIENT WAS FOUND ON THE FLOOR AFTER HER BED ALARM WENT OFF. THIS
PATIENT HAS HAD A VERY RESTLESS NIGHT. PATIENT HAD BEEN PICKED UP OFF THE
FLOOR AFTER A PHYSICAL ASSESSMENT FOR INJURY WAS DONE. VS AT 0530 227/113,
P102, 02 93%.  CALLED A RAT TEAM AND RESPIRATORY CAME AS WELL. CONSUELO HAD DONE
A STAT ABG EARLIER PH 7.321,FLV040.8, PO2 72.8,HCO3 27.2. HOUSE SUP D/C END OF
SHIFT ABG'S. PATIENT HAD A ONE TIME DOES OF HALDOL. THIS FALL WAS NOT
WITNESSED. PATIENT FOUND ON FLOOR BY PCA.TRY TO CALL FAMILY AND NO ANSWER.
WILL TRY AGAIN WHEN THIS PROJECT IS COMPLETED.0630 VS TAKEN AND DOCUMENTED

## 2021-08-18 NOTE — NUR
PT DID BETTER AFTER BEING ON THE BIPAP. PT NOW MORE AWAKE UT GETTING AGITATED.
OBTAIN ORDER TO GIVE IV ATIVAN. UPDATE FAMILY AT BEDSIDE.

## 2021-08-18 NOTE — NUR
UPON INTITIAL ASSESSMENT THIS AM PT WAS AGITATED AND WANTING TO LEAVE. PT
REPRESENTATIVE EUGENIA CAME TO ROOM AND ASSISTED WITH CALMING PT DOWN AND
SPOKE WITH FAMILY. AROUND 0830 IN THE AM WHILE REASSESSING PT FOUND HER TO BE
LETHARGIC AND LUNG SOUNDS WHEEZY. CALLED RAPID RESPONSE AND PT NOW ON BIPAP.
DR. WHITTINGTON UPDATE PT'S DAUGHTER IN ROOM. WILL CONTINUE TO ASSESS.

## 2021-08-19 VITALS — DIASTOLIC BLOOD PRESSURE: 59 MMHG | SYSTOLIC BLOOD PRESSURE: 154 MMHG

## 2021-08-19 VITALS — SYSTOLIC BLOOD PRESSURE: 191 MMHG | DIASTOLIC BLOOD PRESSURE: 86 MMHG

## 2021-08-19 VITALS — DIASTOLIC BLOOD PRESSURE: 95 MMHG | SYSTOLIC BLOOD PRESSURE: 188 MMHG

## 2021-08-19 VITALS — DIASTOLIC BLOOD PRESSURE: 79 MMHG | SYSTOLIC BLOOD PRESSURE: 172 MMHG

## 2021-08-19 VITALS — DIASTOLIC BLOOD PRESSURE: 86 MMHG | SYSTOLIC BLOOD PRESSURE: 177 MMHG

## 2021-08-19 LAB
ANION GAP SERPL CALC-SCNC: 8 MMOL/L (ref 7–16)
BUN SERPL-MCNC: 30 MG/DL (ref 7–18)
CALCIUM SERPL-MCNC: 8.7 MG/DL (ref 8.5–10.1)
CHLORIDE SERPL-SCNC: 109 MMOL/L (ref 98–107)
CO2 SERPL-SCNC: 29 MMOL/L (ref 21–32)
CREAT SERPL-MCNC: 1.7 MG/DL (ref 0.6–1)
ERYTHROCYTE [DISTWIDTH] IN BLOOD BY AUTOMATED COUNT: 15.9 % (ref 10.5–14.5)
GLUCOSE SERPL-MCNC: 198 MG/DL (ref 74–106)
HCT VFR BLD CALC: 26.6 % (ref 37–47)
HGB BLD-MCNC: 8.6 GM/DL (ref 12–15)
MCH RBC QN AUTO: 26.1 PG (ref 26–34)
MCHC RBC AUTO-ENTMCNC: 32.3 G/DL (ref 28–37)
MCV RBC: 81 FL (ref 80–100)
PLATELET # BLD: 167 THOU/UL (ref 150–400)
POTASSIUM SERPL-SCNC: 3.9 MMOL/L (ref 3.5–5.1)
RBC # BLD AUTO: 3.28 MIL/UL (ref 4.2–5)
SODIUM SERPL-SCNC: 146 MMOL/L (ref 136–145)
WBC # BLD AUTO: 8.7 THOU/UL (ref 4–11)

## 2021-08-19 PROCEDURE — 5A09357 ASSISTANCE WITH RESPIRATORY VENTILATION, LESS THAN 24 CONSECUTIVE HOURS, CONTINUOUS POSITIVE AIRWAY PRESSURE: ICD-10-PCS | Performed by: INTERNAL MEDICINE

## 2021-08-19 NOTE — NUR
Case discussed with the care team. Pt continues on bipap and dtr at bedside
most of the day. ID following as well as pulm. Pt on iv atb and may need
bronch with bx pending her progress. Pt has a complex pulmonary hx and
mulitple types pneumonia. Dc time frame is uncertain. Will ask for therapy
evals once her respiratory status improves.

## 2021-08-19 NOTE — NUR
PT RESTING COMFORTABLY. HAS BEEN ON BIPAP OR NASAL CANNULA THROUGHOUT SHIFT.
PSYCH CONSULTED. PT AFEBRILE, ADEQUATE UOP (PURE-WICK), BM X1, FAIR APPETITE.
PT HAS SOME CONFUSION. FIRM ABDOMEN. DAUGHTER AT BEDSIDE INTERMITTENLY. SHE
CAN BE VERY DEMANDING, AND WANTS CONSTANT UPDATES. PT AND FAMILY HAVE BEEN
THOUROUGHLY UPDATED AND EDUCATED ON PT CONDITION AND POC. PT SLOWLY
PROGRESSING TOWARDS POC.

## 2021-08-19 NOTE — NUR
ASSUME CARE 1900. PT/VITALS STABLE. HR RUNS SOFT. DENIES ANY PAIN. MODERATE
TOLERANCE TO ACTIVITY. EXTREMELY SOB WITH MILD ACTIVITY. ASSESSMENT AS
CHARTED. PROGRESSING MODERATELY TOWARDS POC. SB ON MONTOR. PT ON BIPAP FIO2
AT30% SATS 93-94% WHEN RESTING/SLEEPING. PLAN IS TO CONTINUE TO MONITOR AND
MANAGE RESP FUNCTION AND CONTINUE ABX THERAPY. WILL CONTINUE TO MONITOR AND
FOLLOW WITH POC

## 2021-08-20 VITALS — SYSTOLIC BLOOD PRESSURE: 183 MMHG | DIASTOLIC BLOOD PRESSURE: 72 MMHG

## 2021-08-20 VITALS — SYSTOLIC BLOOD PRESSURE: 193 MMHG | DIASTOLIC BLOOD PRESSURE: 77 MMHG

## 2021-08-20 VITALS — DIASTOLIC BLOOD PRESSURE: 94 MMHG | SYSTOLIC BLOOD PRESSURE: 195 MMHG

## 2021-08-20 VITALS — DIASTOLIC BLOOD PRESSURE: 52 MMHG | SYSTOLIC BLOOD PRESSURE: 162 MMHG

## 2021-08-20 VITALS — SYSTOLIC BLOOD PRESSURE: 173 MMHG | DIASTOLIC BLOOD PRESSURE: 69 MMHG

## 2021-08-20 LAB — 1,3 BETA GLUCAN SER-MCNC: < 31 PG/ML (ref ?–80)

## 2021-08-20 PROCEDURE — 5A09357 ASSISTANCE WITH RESPIRATORY VENTILATION, LESS THAN 24 CONSECUTIVE HOURS, CONTINUOUS POSITIVE AIRWAY PRESSURE: ICD-10-PCS | Performed by: INTERNAL MEDICINE

## 2021-08-20 NOTE — NUR
ASSUME CARE 1900. PT/VITALS STABLE/ BP RUNS HIGH. PT ON HYDRALAZINE TO
STABILIZE BP. DENIES ANY PAIN. TOLERATES ACTIVITY MODERTELY/SOB WITH ACTIVITY.
UP TO BEDSIDE COMMODE. TITRATED NASAL CANULA DOWN TO 3L AND PT SATS HIGH 90s.
NASL CANNULA DURING DAY AND BIPAP AT NIGHT. RESPIRATORY FUNCTION IMPROVING. N
DISTRESS NOTED THROUGH THE NIGHT. BRADYCARDIA NOTED ESPCIALYY DURING SLEEP PT
DIPS TO THE 40s. ASSESSMENT AAS CHARTED. PROGRESSING WELL WITH POC. PLAN IS TO
CONTINUE TO MONITOR/MANAGE RESPIRATORY FUNCTION. WILL CONTINUE TO MONITOR AND
FOLLOW WITH POC

## 2021-08-20 NOTE — NUR
PT RESTING COMFORTABLY. WORKED WITH PT/OT. PT GOES BETWEEN 3L-NC AND BIPAP
(30% FIO2), CONTINUES TO BE VERY WHEEZY UPON OSCULTATION. PT AFEBRILE,
ADEQUATE UOP, NO BM, DECENT APPETITE. PT AND DAUGHTER AT BEDSIDE HAVE BEEN
THOUROUGHLY UPDATED AND EDUCATED ON PT CONDITION AND POC. PT SLOWLY
PROGRESSING TOWARDS POC.

## 2021-08-21 VITALS — SYSTOLIC BLOOD PRESSURE: 181 MMHG | DIASTOLIC BLOOD PRESSURE: 75 MMHG

## 2021-08-21 VITALS — SYSTOLIC BLOOD PRESSURE: 166 MMHG | DIASTOLIC BLOOD PRESSURE: 72 MMHG

## 2021-08-21 VITALS — DIASTOLIC BLOOD PRESSURE: 68 MMHG | SYSTOLIC BLOOD PRESSURE: 161 MMHG

## 2021-08-21 VITALS — DIASTOLIC BLOOD PRESSURE: 61 MMHG | SYSTOLIC BLOOD PRESSURE: 148 MMHG

## 2021-08-21 LAB
ANION GAP SERPL CALC-SCNC: 7 MMOL/L (ref 7–16)
BUN SERPL-MCNC: 33 MG/DL (ref 7–18)
CALCIUM SERPL-MCNC: 8.8 MG/DL (ref 8.5–10.1)
CHLORIDE SERPL-SCNC: 105 MMOL/L (ref 98–107)
CO2 SERPL-SCNC: 30 MMOL/L (ref 21–32)
CREAT SERPL-MCNC: 1.5 MG/DL (ref 0.6–1)
ERYTHROCYTE [DISTWIDTH] IN BLOOD BY AUTOMATED COUNT: 15.8 % (ref 10.5–14.5)
GLUCOSE SERPL-MCNC: 220 MG/DL (ref 74–106)
HCT VFR BLD CALC: 28 % (ref 37–47)
HGB BLD-MCNC: 9.1 GM/DL (ref 12–15)
MAGNESIUM SERPL-MCNC: 1.8 MG/DL (ref 1.8–2.4)
MCH RBC QN AUTO: 25.8 PG (ref 26–34)
MCHC RBC AUTO-ENTMCNC: 32.5 G/DL (ref 28–37)
MCV RBC: 79.4 FL (ref 80–100)
PLATELET # BLD: 187 THOU/UL (ref 150–400)
POTASSIUM SERPL-SCNC: 3.7 MMOL/L (ref 3.5–5.1)
RBC # BLD AUTO: 3.52 MIL/UL (ref 4.2–5)
SODIUM SERPL-SCNC: 142 MMOL/L (ref 136–145)
WBC # BLD AUTO: 11.6 THOU/UL (ref 4–11)

## 2021-08-21 PROCEDURE — 5A09357 ASSISTANCE WITH RESPIRATORY VENTILATION, LESS THAN 24 CONSECUTIVE HOURS, CONTINUOUS POSITIVE AIRWAY PRESSURE: ICD-10-PCS | Performed by: INTERNAL MEDICINE

## 2021-08-21 NOTE — NUR
Pt. rested quietly at intervals during the night when checked on during
frequent rounds.  Lung soounds with wheezes.  No c/o shortness of air.
Continues on O2 at 3 liters per a nasal canula.  C/o not being able to rest
and po xanax offered, but she refused.  Bed alarm is on.

## 2021-08-21 NOTE — NUR
PT ALERT AND ORIENTED TODAY, TAKING IN GOOD PO. REMAINS ON 3L NC BUT SATES SHE
IS BREATHING BETTER. ENCOURAGE OUT OF BED AND RSV SWAB SENT TO LAB. WILL
CONTINUE TO ASSESS.

## 2021-08-22 VITALS — SYSTOLIC BLOOD PRESSURE: 152 MMHG | DIASTOLIC BLOOD PRESSURE: 76 MMHG

## 2021-08-22 VITALS — SYSTOLIC BLOOD PRESSURE: 173 MMHG | DIASTOLIC BLOOD PRESSURE: 61 MMHG

## 2021-08-22 VITALS — DIASTOLIC BLOOD PRESSURE: 80 MMHG | SYSTOLIC BLOOD PRESSURE: 192 MMHG

## 2021-08-22 VITALS — SYSTOLIC BLOOD PRESSURE: 166 MMHG | DIASTOLIC BLOOD PRESSURE: 75 MMHG

## 2021-08-22 PROCEDURE — 5A09357 ASSISTANCE WITH RESPIRATORY VENTILATION, LESS THAN 24 CONSECUTIVE HOURS, CONTINUOUS POSITIVE AIRWAY PRESSURE: ICD-10-PCS | Performed by: INTERNAL MEDICINE

## 2021-08-22 NOTE — NUR
Assumed pt care at 1900. Pt is alert and oriented. No sign of distress noted
in pt. Pt is laying in bed. Assessment completed and documented. Pt is stable.
Fall precaution in place. Scheduled meds administered to pt. Tolerated PO
inatake. No acute event during the night. Continue to monitor. No further
needs at this time.

## 2021-08-22 NOTE — NUR
PT ALERT AND ORIENTED TIMES FOUR. BP ELEVATED SCHEDULED BP MEDS GIVEN BP
BETTER. OTHER VSS. PT DENEIS PAIN. PT TOLERATES MEDS AND MEALS. PT UP TO BSC
WITH ASSIST OF ONE. PT DAUGHTER AT BEDSIDE THIS MORNING. PT SLOWLY PROGRESSING
GUSRADS POC GOALS.

## 2021-08-23 VITALS — SYSTOLIC BLOOD PRESSURE: 188 MMHG | DIASTOLIC BLOOD PRESSURE: 82 MMHG

## 2021-08-23 VITALS — SYSTOLIC BLOOD PRESSURE: 143 MMHG | DIASTOLIC BLOOD PRESSURE: 63 MMHG

## 2021-08-23 VITALS — SYSTOLIC BLOOD PRESSURE: 164 MMHG | DIASTOLIC BLOOD PRESSURE: 64 MMHG

## 2021-08-23 VITALS — SYSTOLIC BLOOD PRESSURE: 185 MMHG | DIASTOLIC BLOOD PRESSURE: 78 MMHG

## 2021-08-23 VITALS — SYSTOLIC BLOOD PRESSURE: 173 MMHG | DIASTOLIC BLOOD PRESSURE: 103 MMHG

## 2021-08-23 VITALS — DIASTOLIC BLOOD PRESSURE: 68 MMHG | SYSTOLIC BLOOD PRESSURE: 156 MMHG

## 2021-08-23 VITALS — SYSTOLIC BLOOD PRESSURE: 166 MMHG | DIASTOLIC BLOOD PRESSURE: 61 MMHG

## 2021-08-23 LAB
ALBUMIN SERPL-MCNC: 2.1 G/DL (ref 3.4–5)
ALT SERPL-CCNC: 35 U/L (ref 30–65)
ANION GAP SERPL CALC-SCNC: 6 MMOL/L (ref 7–16)
AST SERPL-CCNC: 10 U/L (ref 15–37)
BASOPHILS NFR BLD AUTO: 0 % (ref 0–2)
BILIRUB SERPL-MCNC: 0.4 MG/DL (ref 0.2–1)
BUN SERPL-MCNC: 46 MG/DL (ref 7–18)
CALCIUM SERPL-MCNC: 8.1 MG/DL (ref 8.5–10.1)
CHLORIDE SERPL-SCNC: 106 MMOL/L (ref 98–107)
CO2 SERPL-SCNC: 29 MMOL/L (ref 21–32)
CREAT SERPL-MCNC: 1.6 MG/DL (ref 0.6–1)
EOSINOPHIL NFR BLD: 0 % (ref 0–3)
ERYTHROCYTE [DISTWIDTH] IN BLOOD BY AUTOMATED COUNT: 15.6 % (ref 10.5–14.5)
GLUCOSE SERPL-MCNC: 217 MG/DL (ref 74–106)
GRANULOCYTES NFR BLD MANUAL: 84 % (ref 36–66)
HCT VFR BLD CALC: 26.1 % (ref 37–47)
HGB BLD-MCNC: 8.7 GM/DL (ref 12–15)
LYMPHOCYTES NFR BLD AUTO: 12 % (ref 24–44)
MAGNESIUM SERPL-MCNC: 2 MG/DL (ref 1.8–2.4)
MCH RBC QN AUTO: 26.7 PG (ref 26–34)
MCHC RBC AUTO-ENTMCNC: 33.4 G/DL (ref 28–37)
MCV RBC: 80 FL (ref 80–100)
MONOCYTES NFR BLD: 3 % (ref 1–8)
NEUTROPHILS # BLD: 8.8 THOU/UL (ref 1.4–8.2)
NEUTS BAND NFR BLD: 1 % (ref 0–8)
PHOSPHATE SERPL-MCNC: 3.3 MG/DL (ref 2.5–4.9)
PLATELET # BLD: 213 THOU/UL (ref 150–400)
POTASSIUM SERPL-SCNC: 3.8 MMOL/L (ref 3.5–5.1)
PROT SERPL-MCNC: 5.7 G/DL (ref 6.4–8.2)
RBC # BLD AUTO: 3.26 MIL/UL (ref 4.2–5)
RBC MORPH BLD: NORMAL
SODIUM SERPL-SCNC: 141 MMOL/L (ref 136–145)
WBC # BLD AUTO: 10.4 THOU/UL (ref 4–11)

## 2021-08-23 NOTE — NUR
RN ASSUMED PT'S CARE AT 0700AM, PT IS A&OX4, PT IS ON O2 1L/MIN/NC, PT'S O2SAT
STAYS AT %, PT IS CONTINUING IV ABX, AND MANAGE BP AND ANXIETY, PT GETS
UP TO BSC WITHOUT ASSIST, RN HAS CALLED DR TO REPORT PT HAS MORE BLOOD IN HER
SPUTUM,AND LOW HR,
NEW ORDER TO CHECK CBC IN AM, AND WE CONTINUIE TO MONITOR .

## 2021-08-23 NOTE — NUR
Nutrition: pt seen due to LOS. Admitted with exacerbation of interstitial lung
disease. PMH: HTN, PNA, COPD, "pre-DM", sarcoidosis. -191. Meds steroid,
SSI, lasix, vitamin Pack. Pt reports improving appetite recently. % of
meals documented 8/22. Voiced no recent weight loss rather a 5# weight gain
with need for steroids. Provided pt with alternative menu as she would like to
order meals. Voiced no other needs from RD at this time. Place as low risk.

## 2021-08-24 VITALS — DIASTOLIC BLOOD PRESSURE: 89 MMHG | SYSTOLIC BLOOD PRESSURE: 192 MMHG

## 2021-08-24 VITALS — DIASTOLIC BLOOD PRESSURE: 62 MMHG | SYSTOLIC BLOOD PRESSURE: 159 MMHG

## 2021-08-24 VITALS — DIASTOLIC BLOOD PRESSURE: 77 MMHG | SYSTOLIC BLOOD PRESSURE: 178 MMHG

## 2021-08-24 VITALS — DIASTOLIC BLOOD PRESSURE: 67 MMHG | SYSTOLIC BLOOD PRESSURE: 144 MMHG

## 2021-08-24 LAB
ANION GAP SERPL CALC-SCNC: 4 MMOL/L (ref 7–16)
BASOPHILS NFR BLD AUTO: 0.2 % (ref 0–2)
BUN SERPL-MCNC: 46 MG/DL (ref 7–18)
CALCIUM SERPL-MCNC: 8.4 MG/DL (ref 8.5–10.1)
CHLORIDE SERPL-SCNC: 107 MMOL/L (ref 98–107)
CO2 SERPL-SCNC: 32 MMOL/L (ref 21–32)
CREAT SERPL-MCNC: 1.7 MG/DL (ref 0.6–1)
EOSINOPHIL NFR BLD: 0 % (ref 0–3)
ERYTHROCYTE [DISTWIDTH] IN BLOOD BY AUTOMATED COUNT: 16.1 % (ref 10.5–14.5)
GLUCOSE SERPL-MCNC: 237 MG/DL (ref 74–106)
GRANULOCYTES NFR BLD MANUAL: 85.7 % (ref 36–66)
HCT VFR BLD CALC: 25.9 % (ref 37–47)
HGB BLD-MCNC: 8.6 GM/DL (ref 12–15)
LYMPHOCYTES NFR BLD AUTO: 7.3 % (ref 24–44)
MAGNESIUM SERPL-MCNC: 2.2 MG/DL (ref 1.8–2.4)
MCH RBC QN AUTO: 26.6 PG (ref 26–34)
MCHC RBC AUTO-ENTMCNC: 33 G/DL (ref 28–37)
MCV RBC: 80.5 FL (ref 80–100)
MONOCYTES NFR BLD: 6.8 % (ref 1–8)
NEUTROPHILS # BLD: 7.9 THOU/UL (ref 1.4–8.2)
PLATELET # BLD: 227 THOU/UL (ref 150–400)
POTASSIUM SERPL-SCNC: 4.2 MMOL/L (ref 3.5–5.1)
RBC # BLD AUTO: 3.22 MIL/UL (ref 4.2–5)
SODIUM SERPL-SCNC: 143 MMOL/L (ref 136–145)
WBC # BLD AUTO: 9.3 THOU/UL (ref 4–11)

## 2021-08-24 NOTE — NUR
ASSESSMENT AS CHARTED - MEDS AS PER MAR -  CINDY DIET AND FLUIDS.  NO CO'S OF
PAIN OR NAUSEA. AMBULATED IN THE [place] WITH PHYS THERAPY - WORKED WITH OCC
THERAPY TODAY.  ACCUCHECKS AS CHARTED -  COVERED PER SSI.  PT UP TO THE CHAIR
THIS AFTERNOON - CINDY WELL. NO CO'S AT THE PRESENT TIME.

## 2021-08-24 NOTE — NUR
Met with patient yesterday evening. She has requested to not see OT. She has
multiple concerns regarding hospital stay. Dr Messina aware and sp with patient
regarding importance of accepting therapy during hospital stay. Patient plans
to work with therapy. May be open to rehab at [place].

## 2021-08-25 VITALS — SYSTOLIC BLOOD PRESSURE: 160 MMHG | DIASTOLIC BLOOD PRESSURE: 61 MMHG

## 2021-08-25 VITALS — DIASTOLIC BLOOD PRESSURE: 57 MMHG | SYSTOLIC BLOOD PRESSURE: 139 MMHG

## 2021-08-25 VITALS — DIASTOLIC BLOOD PRESSURE: 66 MMHG | SYSTOLIC BLOOD PRESSURE: 170 MMHG

## 2021-08-25 LAB
ANION GAP SERPL CALC-SCNC: 5 MMOL/L (ref 7–16)
BUN SERPL-MCNC: 50 MG/DL (ref 7–18)
CALCIUM SERPL-MCNC: 8.3 MG/DL (ref 8.5–10.1)
CHLORIDE SERPL-SCNC: 106 MMOL/L (ref 98–107)
CO2 SERPL-SCNC: 30 MMOL/L (ref 21–32)
CREAT SERPL-MCNC: 1.7 MG/DL (ref 0.6–1)
ERYTHROCYTE [DISTWIDTH] IN BLOOD BY AUTOMATED COUNT: 16.7 % (ref 10.5–14.5)
GLUCOSE SERPL-MCNC: 190 MG/DL (ref 74–106)
HCT VFR BLD CALC: 26.6 % (ref 37–47)
HGB BLD-MCNC: 8.7 GM/DL (ref 12–15)
MAGNESIUM SERPL-MCNC: 2.4 MG/DL (ref 1.8–2.4)
MCH RBC QN AUTO: 26.7 PG (ref 26–34)
MCHC RBC AUTO-ENTMCNC: 32.6 G/DL (ref 28–37)
MCV RBC: 81.7 FL (ref 80–100)
PLATELET # BLD: 231 THOU/UL (ref 150–400)
POTASSIUM SERPL-SCNC: 4.8 MMOL/L (ref 3.5–5.1)
RBC # BLD AUTO: 3.26 MIL/UL (ref 4.2–5)
SODIUM SERPL-SCNC: 141 MMOL/L (ref 136–145)
WBC # BLD AUTO: 10.3 THOU/UL (ref 4–11)

## 2021-08-25 NOTE — NUR
PT REFUSED OT THIS DAY. THEY DISCHARGED AS PT HAD REFUSED THREE TIMES. DOESN'T
LOOK LIT PT HAS SEEN PT AS OF THIS NOTE. CM FOLLOWING REGARDING DC PLANNING.

## 2021-08-25 NOTE — NUR
Pt. rested quietly at intervals during the night when checked on during
frequent rounds.  She voiced concerns about the edema in her lower
extremities.  Pt. has about 1+ bilateral lower leg edema.  No c/o shortness
of air.  Pt. is elevating her legs.

## 2021-08-26 VITALS — DIASTOLIC BLOOD PRESSURE: 66 MMHG | SYSTOLIC BLOOD PRESSURE: 158 MMHG

## 2021-08-26 VITALS — SYSTOLIC BLOOD PRESSURE: 154 MMHG | DIASTOLIC BLOOD PRESSURE: 61 MMHG

## 2021-08-26 LAB
ANION GAP SERPL CALC-SCNC: 5 MMOL/L (ref 7–16)
BUN SERPL-MCNC: 56 MG/DL (ref 7–18)
CALCIUM SERPL-MCNC: 8.2 MG/DL (ref 8.5–10.1)
CHLORIDE SERPL-SCNC: 103 MMOL/L (ref 98–107)
CO2 SERPL-SCNC: 32 MMOL/L (ref 21–32)
CREAT SERPL-MCNC: 1.8 MG/DL (ref 0.6–1)
GLUCOSE SERPL-MCNC: 229 MG/DL (ref 74–106)
MAGNESIUM SERPL-MCNC: 2.2 MG/DL (ref 1.8–2.4)
POTASSIUM SERPL-SCNC: 4.9 MMOL/L (ref 3.5–5.1)
SODIUM SERPL-SCNC: 140 MMOL/L (ref 136–145)

## 2021-08-26 NOTE — NUR
PT CARE ASSUMED WITH PT IN THE CHAIR WITH GRANDDAUGHTER AT BEDSIDE.PT IS A/O
X4.PT IS UP WITH STANDBY ASSIST TO THE BSC.PT ABLE TO CALL FOR HELP AS
NEEDED.PT IS ACCUCHECK ACHS.PT HAS BLE EDEMA..PT IS ON 2L OF O2 WITH
CONTINUOUS PULSE OX.IV ON RT HAND SL.WILL CONTINUE TO MONITOR PER POC

## 2021-08-26 NOTE — NUR
CARE TEAM INDICTED THAT PT IS PROGRESSING TOWARD GOAL OF DISCHARGE. PT IS ON
3L O2 CURRENTLY. CM MET WITH PT AT BEDSIDE THIS DAY AND INQUIRED AS TO WHETHER
SHE HAD O2 FOR HOME USE. SHE INDICATED SHE HAD HOME O2 THROUGH [employer] YE[facility]O. SHE INDICATED THAT SHE HAS AS NEBULIZER FOR HOME USE BUT THAT SHE WANTS A
NEW ONE SHE SAID IT MIGHT BE 4 YEARS OLD. CM FOLLOWING TO ASSIST WITH HOME O2
AND POSSIBLE A NEW NEBULIZER UPON DC.

## 2021-08-27 VITALS — DIASTOLIC BLOOD PRESSURE: 63 MMHG | SYSTOLIC BLOOD PRESSURE: 187 MMHG

## 2021-08-27 VITALS — SYSTOLIC BLOOD PRESSURE: 144 MMHG | DIASTOLIC BLOOD PRESSURE: 67 MMHG

## 2021-08-27 LAB
ANION GAP SERPL CALC-SCNC: 4 MMOL/L (ref 7–16)
BUN SERPL-MCNC: 52 MG/DL (ref 7–18)
CALCIUM SERPL-MCNC: 8.3 MG/DL (ref 8.5–10.1)
CHLORIDE SERPL-SCNC: 103 MMOL/L (ref 98–107)
CO2 SERPL-SCNC: 34 MMOL/L (ref 21–32)
CREAT SERPL-MCNC: 1.6 MG/DL (ref 0.6–1)
ERYTHROCYTE [DISTWIDTH] IN BLOOD BY AUTOMATED COUNT: 16.8 % (ref 10.5–14.5)
GLUCOSE SERPL-MCNC: 178 MG/DL (ref 74–106)
HCT VFR BLD CALC: 26.4 % (ref 37–47)
HGB BLD-MCNC: 8.6 GM/DL (ref 12–15)
MAGNESIUM SERPL-MCNC: 2.2 MG/DL (ref 1.8–2.4)
MCH RBC QN AUTO: 26.5 PG (ref 26–34)
MCHC RBC AUTO-ENTMCNC: 32.7 G/DL (ref 28–37)
MCV RBC: 81 FL (ref 80–100)
PLATELET # BLD: 238 THOU/UL (ref 150–400)
POTASSIUM SERPL-SCNC: 4.4 MMOL/L (ref 3.5–5.1)
RBC # BLD AUTO: 3.26 MIL/UL (ref 4.2–5)
SODIUM SERPL-SCNC: 141 MMOL/L (ref 136–145)
WBC # BLD AUTO: 11.8 THOU/UL (ref 4–11)

## 2021-08-27 NOTE — NUR
ASSUMED PT CARE THIS AM. PT A&OX4, ABLE TO MAKE NEEDS KNOWN. PATIENT ON 3
LITERS OF OXYGEN VIA NC. PATIENT REMAINS CONTINENT, UP TO BEDSIDE COMMODE WITH
ASSIST. PATIENT HAS NO COMPLAINTS OF PAIN OR NUMBNESS. PATIENT IV PATENT,
SALINE LOCKED. FALL PRECAUTIONS ARE IN PLACE, CALL LIGHT WITHIN REACH.
MEDICAITONS TAKEN WITHOUT ISSUE.

## 2021-08-27 NOTE — NUR
PT LEFT VIA W/C.  EXPLAINED THAT THE NEBULIZER WILL BE DELIVERED
TO HER BY [facility]. PT VERBALY UNDERSTOOD. PT STATED SHE IS GIVING THIS PLACE
97% FOR PROFESSIONALISM.

## 2021-08-27 NOTE — NUR
PT WANTING TO KNOW ABOUT THE NEBULIZER MACHINE. SHE SAID THAT THE HOSPITAL
WILL HAVE IT ON HER BILL. PT WAS GETTING PHONE CALL FROM [facility] AND DIDN'T
ANSWER THE PHONE, THEN PT CALLED BACK AND HUNG UP. VIDAL WALLACE IS HERE AT THIS
TIME.

## 2021-08-27 NOTE — NUR
PT CARE ASSUMED WITH PT IN BED WATCHING TV.PT IS A/O X4.PT IS UP WITH STANDBY
ASSIST TO BEDSIDE COMMODE .PT GOT ANXIOUS ABOUT PULSE OX MACHINE BEEPING AND
MAKING HER SCARED DURING THE NIGHT.BP PRESSURE AT 0215 TAKEN 183/77 AND PT
REFUSED TO TAKE SCHEDULED HYDRALAZIN.PT GIVEN MUCINEX FOR COUGHING.PT WORRIED
ABOUT SWELLING ON BLE.PT REASSURED.WILL CONTINUE TO MONITOR

## 2021-09-08 ENCOUNTER — HOSPITAL ENCOUNTER (INPATIENT)
Dept: HOSPITAL 35 - ER | Age: 76
LOS: 8 days | Discharge: HOME | DRG: 304 | End: 2021-09-16
Attending: INTERNAL MEDICINE | Admitting: INTERNAL MEDICINE
Payer: COMMERCIAL

## 2021-09-08 VITALS — DIASTOLIC BLOOD PRESSURE: 89 MMHG | SYSTOLIC BLOOD PRESSURE: 219 MMHG

## 2021-09-08 VITALS — DIASTOLIC BLOOD PRESSURE: 94 MMHG | SYSTOLIC BLOOD PRESSURE: 126 MMHG

## 2021-09-08 VITALS — WEIGHT: 278.29 LBS | BODY MASS INDEX: 43.68 KG/M2 | HEIGHT: 67.01 IN

## 2021-09-08 VITALS — DIASTOLIC BLOOD PRESSURE: 70 MMHG | SYSTOLIC BLOOD PRESSURE: 134 MMHG

## 2021-09-08 VITALS — DIASTOLIC BLOOD PRESSURE: 73 MMHG | SYSTOLIC BLOOD PRESSURE: 155 MMHG

## 2021-09-08 DIAGNOSIS — Z86.16: ICD-10-CM

## 2021-09-08 DIAGNOSIS — E43: ICD-10-CM

## 2021-09-08 DIAGNOSIS — J84.10: ICD-10-CM

## 2021-09-08 DIAGNOSIS — D64.9: ICD-10-CM

## 2021-09-08 DIAGNOSIS — Z79.82: ICD-10-CM

## 2021-09-08 DIAGNOSIS — Z79.899: ICD-10-CM

## 2021-09-08 DIAGNOSIS — F41.1: ICD-10-CM

## 2021-09-08 DIAGNOSIS — D86.9: ICD-10-CM

## 2021-09-08 DIAGNOSIS — N17.9: ICD-10-CM

## 2021-09-08 DIAGNOSIS — I16.0: Primary | ICD-10-CM

## 2021-09-08 DIAGNOSIS — N18.9: ICD-10-CM

## 2021-09-08 DIAGNOSIS — I12.9: ICD-10-CM

## 2021-09-08 DIAGNOSIS — J44.9: ICD-10-CM

## 2021-09-08 DIAGNOSIS — N28.89: ICD-10-CM

## 2021-09-08 DIAGNOSIS — Z91.14: ICD-10-CM

## 2021-09-08 DIAGNOSIS — Z20.822: ICD-10-CM

## 2021-09-08 DIAGNOSIS — K21.9: ICD-10-CM

## 2021-09-08 DIAGNOSIS — D69.6: ICD-10-CM

## 2021-09-08 DIAGNOSIS — J98.4: ICD-10-CM

## 2021-09-08 DIAGNOSIS — R53.81: ICD-10-CM

## 2021-09-08 DIAGNOSIS — Z88.8: ICD-10-CM

## 2021-09-08 DIAGNOSIS — E87.1: ICD-10-CM

## 2021-09-08 LAB
ANION GAP SERPL CALC-SCNC: 8 MMOL/L (ref 7–16)
BASOPHILS NFR BLD AUTO: 0.3 % (ref 0–2)
BUN SERPL-MCNC: 18 MG/DL (ref 7–18)
CALCIUM SERPL-MCNC: 8.1 MG/DL (ref 8.5–10.1)
CHLORIDE SERPL-SCNC: 108 MMOL/L (ref 98–107)
CO2 SERPL-SCNC: 33 MMOL/L (ref 21–32)
CREAT SERPL-MCNC: 1.3 MG/DL (ref 0.6–1)
EOSINOPHIL NFR BLD: 3.4 % (ref 0–3)
ERYTHROCYTE [DISTWIDTH] IN BLOOD BY AUTOMATED COUNT: 18.6 % (ref 10.5–14.5)
FERRITIN SERPL-MCNC: 71 NG/ML (ref 8–252)
FOLATE SERPL-MCNC: 9.9 NG/ML (ref 8.6–58.9)
GLUCOSE SERPL-MCNC: 107 MG/DL (ref 74–106)
GRANULOCYTES NFR BLD MANUAL: 60.5 % (ref 36–66)
HCT VFR BLD CALC: 25.4 % (ref 37–47)
HGB BLD-MCNC: 8 GM/DL (ref 12–15)
IRON SERPL-MCNC: 38 UG/DL (ref 50–170)
LYMPHOCYTES NFR BLD AUTO: 28.7 % (ref 24–44)
MCH RBC QN AUTO: 26.3 PG (ref 26–34)
MCHC RBC AUTO-ENTMCNC: 31.5 G/DL (ref 28–37)
MCV RBC: 83.2 FL (ref 80–100)
MONOCYTES NFR BLD: 7.1 % (ref 1–8)
NEUTROPHILS # BLD: 3.1 THOU/UL (ref 1.4–8.2)
PLATELET # BLD: 140 THOU/UL (ref 150–400)
POTASSIUM SERPL-SCNC: 3.8 MMOL/L (ref 3.5–5.1)
RBC # BLD AUTO: 3.05 MIL/UL (ref 4.2–5)
SAO2 % BLD FROM PO2: 18 % (ref 20–39)
SODIUM SERPL-SCNC: 149 MMOL/L (ref 136–145)
TIBC SERPL-MCNC: 212 UG/DL (ref 250–450)
VIT B12 SERPL-MCNC: 264 PG/ML (ref 193–986)
WBC # BLD AUTO: 5.1 THOU/UL (ref 4–11)

## 2021-09-08 PROCEDURE — 10045: CPT

## 2021-09-08 NOTE — EKG
[address] [address] [facility] [place]  [address]
Phone:  [phone]                    ELECTROCARDIOGRAM REPORT      
_______________________________________________________________________________
 
Name:       DEEPTHI JEWELL               Room #:         170-2       ADM IN  
M.R.#:      [id number]     Account #:      [id number]  
Admission:  21    Attend Phys:    Amalia Nettles
Discharge:              Date of Birth:  [birth year]  
                                                          Report #: [id number]
   [id number]
_______________________________________________________________________________
                         [facility] ED
                                       
Test Date:    2021               Test Time:    05:01:12
Pat Name:     DEEPTHI JEWELL            Department:   
Patient ID:   [id number]            Room:         170
Gender:       F                        Technician:   ANTHONY
:          1945               Requested By: Fredo Magallanes
Order Number: [id number] MD:   Kain Carballo
                                 Measurements
Intervals                              Axis          
Rate:         54                       P:            -14
FL:           224                      QRS:          -41
QRSD:         114                      T:            -16
QT:           440                                    
QTc:          417                                    
                           Interpretive Statements
Sinus rhythm
Prolonged FL interval
RSR' in V1 or V2, probably normal variant
Nonspecific T abnormalities, inferior leads
Compared to ECG 2021 08:45:55
Poor R wave progression no longer present
Electronically Signed On 2021 8:30:01 CDT by Kain Carballo
[email or web address]
 
 
 
 
 
 
 
 
 
 
 
 
 
 
 
 
 
 
 
  <ELECTRONICALLY SIGNED>
   By: Kain Carballo MD, [facility]   
  21     0830
D: 21 0501                           _____________________________________
T: 21 0501                           Kain Carballo MD, [facility]     /EPI

## 2021-09-09 VITALS — SYSTOLIC BLOOD PRESSURE: 129 MMHG | DIASTOLIC BLOOD PRESSURE: 48 MMHG

## 2021-09-09 VITALS — DIASTOLIC BLOOD PRESSURE: 45 MMHG | SYSTOLIC BLOOD PRESSURE: 140 MMHG

## 2021-09-09 VITALS — SYSTOLIC BLOOD PRESSURE: 192 MMHG | DIASTOLIC BLOOD PRESSURE: 80 MMHG

## 2021-09-09 VITALS — SYSTOLIC BLOOD PRESSURE: 166 MMHG | DIASTOLIC BLOOD PRESSURE: 64 MMHG

## 2021-09-09 VITALS — DIASTOLIC BLOOD PRESSURE: 73 MMHG | SYSTOLIC BLOOD PRESSURE: 148 MMHG

## 2021-09-09 VITALS — SYSTOLIC BLOOD PRESSURE: 150 MMHG | DIASTOLIC BLOOD PRESSURE: 64 MMHG

## 2021-09-09 VITALS — DIASTOLIC BLOOD PRESSURE: 66 MMHG | SYSTOLIC BLOOD PRESSURE: 156 MMHG

## 2021-09-09 VITALS — SYSTOLIC BLOOD PRESSURE: 161 MMHG | DIASTOLIC BLOOD PRESSURE: 65 MMHG

## 2021-09-09 NOTE — NUR
ASSUMED PT CARE THIS AM. PT A&OX4, ABLE TO MAKE NEEDS KNOWN. PATIENT REPORTS
NO PAIN, NUMBNESS, OR TINGLING. IV REMAINS PATENT, PATIENT IS ON ROOM AIR.
MEDICATIONS TAKEN WITHOUT ISSUE. PATIENT REFUSING TO USE GAIT BELT, AND
IS WANTING STAFF TO LEAVE BED ALARM OFF AS WELL AS ALLOW PATIENT TO GO TO THE
BATHROOM WITHOUT STAFF ASSISTANCE. PATIENT REPEATEDLY EDUCATED ON IMPORTANCE
OF USING BED ALARM, GAIT BELT, AND NURSING ASISSTANCE. CALL LIGHT WITHIN
REACH.

## 2021-09-09 NOTE — NUR
TODAY THIS PT HAS BEEN NSR ON THE HEART MONITOR WITH STABLE VS AND SOME STATED
PAIN IN WHICH SHE HAS GOTTEN MEDICATION FOR AND HAS BEEN ASLEEP SINCE. SHE HAS
SOME CONCERNS ABOUT HER MEDICATION REGIMEN AND THE CHANGES THAT NEED TO
HAPPEN. SHE OTHERWISE HAS BEEN ASLEEP FOR MOST OF THE NIGHT AWAITING FOR THE
NEXT PLAN.

## 2021-09-09 NOTE — NUR
Chart review, cm tired to visit with reagan but unable to. She requested to
talk another time because she on phone with her daughter. Noted he has been
her in the past and cont to dc home, where she lives with her daughter, son in
law and grandkids. Has cane and walker. Has home btx machine. Will cont
following as needed for dc needs. No anticipated needs at dc

## 2021-09-10 VITALS — SYSTOLIC BLOOD PRESSURE: 157 MMHG | DIASTOLIC BLOOD PRESSURE: 59 MMHG

## 2021-09-10 VITALS — SYSTOLIC BLOOD PRESSURE: 176 MMHG | DIASTOLIC BLOOD PRESSURE: 66 MMHG

## 2021-09-10 VITALS — SYSTOLIC BLOOD PRESSURE: 132 MMHG | DIASTOLIC BLOOD PRESSURE: 61 MMHG

## 2021-09-10 VITALS — SYSTOLIC BLOOD PRESSURE: 193 MMHG | DIASTOLIC BLOOD PRESSURE: 90 MMHG

## 2021-09-10 VITALS — SYSTOLIC BLOOD PRESSURE: 228 MMHG | DIASTOLIC BLOOD PRESSURE: 85 MMHG

## 2021-09-10 VITALS — DIASTOLIC BLOOD PRESSURE: 61 MMHG | SYSTOLIC BLOOD PRESSURE: 144 MMHG

## 2021-09-10 LAB
ALBUMIN SERPL-MCNC: 2.5 G/DL (ref 3.4–5)
ANION GAP SERPL CALC-SCNC: 3 MMOL/L (ref 7–16)
BUN SERPL-MCNC: 16 MG/DL (ref 7–18)
CALCIUM SERPL-MCNC: 8.1 MG/DL (ref 8.5–10.1)
CHLORIDE SERPL-SCNC: 108 MMOL/L (ref 98–107)
CO2 SERPL-SCNC: 32 MMOL/L (ref 21–32)
CREAT SERPL-MCNC: 1.2 MG/DL (ref 0.6–1)
GLUCOSE SERPL-MCNC: 125 MG/DL (ref 74–106)
PHOSPHATE SERPL-MCNC: 3.7 MG/DL (ref 2.5–4.9)
POTASSIUM SERPL-SCNC: 4.1 MMOL/L (ref 3.5–5.1)
SODIUM SERPL-SCNC: 143 MMOL/L (ref 136–145)

## 2021-09-10 NOTE — NUR
Discussed during los with the hospitalist, no anticipated dc over the weekend.
Will cont following as needed for dc needs. BPCI.

## 2021-09-10 NOTE — NUR
ASSUMED PT CARE AT 1910. PT IS ALERT AND ORIENTED X4. PT HAD CONCERNS ABOUT
HTN MEDS. PT WAS EDUCATED ON ALL BP MEDS IN THE EMAR. PT THINKS HTN MIGHT BE
STRESS REALTED FROM HOME. BP MONITORED FREQUENTLY. PT EDUCATED ON THE USE OF
CALL LIGHT FOR ASSISTANCE AND PURPOSE OF BED ALARM;PT VERBALIZED
UNDERSTANDING. PT TOLERATING RA. NO OTHER CONCERNS WERE VERBALIZED. FALL
PRECAUTIONS IN PLACE. WILL CONTINUE TO MONITOR.

## 2021-09-10 NOTE — NUR
Assumed pt care at 7am.Pt in and out of bed with assist and walker.
Assessment completed. Vss. but elevated bp noted.Meds given with breakfast and
well tolerated.Dr Nettles here,order noted.Consult called to Dr White. Message
left.Anticipating dc in am.Will continue to monitor.

## 2021-09-11 VITALS — SYSTOLIC BLOOD PRESSURE: 154 MMHG | DIASTOLIC BLOOD PRESSURE: 72 MMHG

## 2021-09-11 VITALS — SYSTOLIC BLOOD PRESSURE: 175 MMHG | DIASTOLIC BLOOD PRESSURE: 80 MMHG

## 2021-09-11 VITALS — DIASTOLIC BLOOD PRESSURE: 57 MMHG | SYSTOLIC BLOOD PRESSURE: 146 MMHG

## 2021-09-11 VITALS — SYSTOLIC BLOOD PRESSURE: 157 MMHG | DIASTOLIC BLOOD PRESSURE: 68 MMHG

## 2021-09-11 LAB
ALBUMIN SERPL-MCNC: 2.4 G/DL (ref 3.4–5)
ANION GAP SERPL CALC-SCNC: 6 MMOL/L (ref 7–16)
BUN SERPL-MCNC: 17 MG/DL (ref 7–18)
CALCIUM SERPL-MCNC: 8.1 MG/DL (ref 8.5–10.1)
CHLORIDE SERPL-SCNC: 110 MMOL/L (ref 98–107)
CO2 SERPL-SCNC: 30 MMOL/L (ref 21–32)
CREAT SERPL-MCNC: 1.4 MG/DL (ref 0.6–1)
GLUCOSE SERPL-MCNC: 130 MG/DL (ref 74–106)
PHOSPHATE SERPL-MCNC: 3.5 MG/DL (ref 2.6–4.7)
POTASSIUM SERPL-SCNC: 3.8 MMOL/L (ref 3.5–5.1)
SODIUM SERPL-SCNC: 146 MMOL/L (ref 136–145)

## 2021-09-11 NOTE — NUR
ASSUMED CARE OF PT AT SHIFT CHANGE. PT IS AOX4 AND LETS NEEDS BW KNOWN. FALL
PRECAUTION IN PLACE. PT DENIED PAIN, NAUSEA OR SOA. ASSESSMENT CHARTED. BP WAS
BETTER CONTROLLED THIS SHIFT. PT WAS ABLE TO GET COMFORTABLE AND SLEEP PART OF
THE SHIFT. VSS AND NO S/S OF ACUTE DISTRESS. WILL CONTINUE TO MONITOR FOR
CHANGES.

## 2021-09-11 NOTE — NUR
Assumed pt care at 7am.Pt in and out of bed with sba. Assessment completed.vss
but bp still elevated. Am meds given with breakfsat and well tolerated.
Dr Nettles here, dc order noted. When pt notified about dc home, she said her
family wasn't home to let her in but will call to see if somebody can pick her
up.Later this afternoon around 1430 after taking her bp med, she said she
called this rn and said that she doesn't feel comfortable going home today but
will stay till am. Dr Nettles notified,he okay pt to stay till am. Pt
notified.Will continue to monitor.

## 2021-09-12 VITALS — SYSTOLIC BLOOD PRESSURE: 147 MMHG | DIASTOLIC BLOOD PRESSURE: 69 MMHG

## 2021-09-12 VITALS — DIASTOLIC BLOOD PRESSURE: 77 MMHG | SYSTOLIC BLOOD PRESSURE: 168 MMHG

## 2021-09-12 VITALS — SYSTOLIC BLOOD PRESSURE: 195 MMHG | DIASTOLIC BLOOD PRESSURE: 78 MMHG

## 2021-09-12 VITALS — DIASTOLIC BLOOD PRESSURE: 68 MMHG | SYSTOLIC BLOOD PRESSURE: 148 MMHG

## 2021-09-12 VITALS — DIASTOLIC BLOOD PRESSURE: 79 MMHG | SYSTOLIC BLOOD PRESSURE: 176 MMHG

## 2021-09-12 VITALS — SYSTOLIC BLOOD PRESSURE: 176 MMHG | DIASTOLIC BLOOD PRESSURE: 75 MMHG

## 2021-09-12 LAB
ANION GAP SERPL CALC-SCNC: 10 MMOL/L (ref 7–16)
BUN SERPL-MCNC: 14 MG/DL (ref 7–18)
CALCIUM SERPL-MCNC: 8.6 MG/DL (ref 8.5–10.1)
CHLORIDE SERPL-SCNC: 108 MMOL/L (ref 98–107)
CO2 SERPL-SCNC: 26 MMOL/L (ref 21–32)
CREAT SERPL-MCNC: 1.4 MG/DL (ref 0.6–1)
ERYTHROCYTE [DISTWIDTH] IN BLOOD BY AUTOMATED COUNT: 19.1 % (ref 10.5–14.5)
GLUCOSE SERPL-MCNC: 190 MG/DL (ref 74–106)
HCT VFR BLD CALC: 26.2 % (ref 37–47)
HGB BLD-MCNC: 8.5 GM/DL (ref 12–15)
MCH RBC QN AUTO: 26.8 PG (ref 26–34)
MCHC RBC AUTO-ENTMCNC: 32.3 G/DL (ref 28–37)
MCV RBC: 83.2 FL (ref 80–100)
PLATELET # BLD: 115 THOU/UL (ref 150–400)
POTASSIUM SERPL-SCNC: 3.7 MMOL/L (ref 3.5–5.1)
RBC # BLD AUTO: 3.15 MIL/UL (ref 4.2–5)
SODIUM SERPL-SCNC: 144 MMOL/L (ref 136–145)
WBC # BLD AUTO: 3.6 THOU/UL (ref 4–11)

## 2021-09-12 NOTE — NUR
Assumed pt care at 1900. A/OX4,VSS,pt happy BP somewhat lower. Denies pain on
assessmnent. Up with cane to BR. Continent of B&B. Looking forward to dc
today. SR/AVB on telemetry. Resting quietly w/o any distress at this time will
continue to monitor pt.

## 2021-09-12 NOTE — NUR
TREATED ELEVATED BLOOD PRESSURE THIS EVENING BY GIVENG MEDS EARLY PER DR. MOORE. PT DENIES PAIN OR SOA, TAKING IN GOOD PO.

## 2021-09-13 VITALS — DIASTOLIC BLOOD PRESSURE: 62 MMHG | SYSTOLIC BLOOD PRESSURE: 147 MMHG

## 2021-09-13 VITALS — DIASTOLIC BLOOD PRESSURE: 75 MMHG | SYSTOLIC BLOOD PRESSURE: 180 MMHG

## 2021-09-13 VITALS — SYSTOLIC BLOOD PRESSURE: 164 MMHG | DIASTOLIC BLOOD PRESSURE: 62 MMHG

## 2021-09-13 VITALS — DIASTOLIC BLOOD PRESSURE: 63 MMHG | SYSTOLIC BLOOD PRESSURE: 168 MMHG

## 2021-09-13 VITALS — SYSTOLIC BLOOD PRESSURE: 163 MMHG | DIASTOLIC BLOOD PRESSURE: 69 MMHG

## 2021-09-13 VITALS — SYSTOLIC BLOOD PRESSURE: 161 MMHG | DIASTOLIC BLOOD PRESSURE: 75 MMHG

## 2021-09-13 NOTE — NUR
ASSUMED PT CARE AT 1920. PT IS ALERT AND ORIENTED X4. PT IS PLEASANT AND
COOPERATIVE. PT HAS CONCERNS ABOUT BP MEDICATIONS AND WHY BP KEEPS GETTING
ELEVATED INTERMITTENTLY EVEN AFTER TAKING THE MEDS. PT IS SBA TO THE BR. PT
TOLERATING RA. MEDS GIVEN PER EMAR ORDERS. NO OTHER CONCERNS WERE VERBALIZED.
FALL PRECAUTION IN PLACE. WILL CONTINUE TO MONITOR.

## 2021-09-14 VITALS — SYSTOLIC BLOOD PRESSURE: 163 MMHG | DIASTOLIC BLOOD PRESSURE: 57 MMHG

## 2021-09-14 VITALS — SYSTOLIC BLOOD PRESSURE: 168 MMHG | DIASTOLIC BLOOD PRESSURE: 71 MMHG

## 2021-09-14 VITALS — SYSTOLIC BLOOD PRESSURE: 132 MMHG | DIASTOLIC BLOOD PRESSURE: 57 MMHG

## 2021-09-14 VITALS — SYSTOLIC BLOOD PRESSURE: 152 MMHG | DIASTOLIC BLOOD PRESSURE: 70 MMHG

## 2021-09-14 VITALS — DIASTOLIC BLOOD PRESSURE: 70 MMHG | SYSTOLIC BLOOD PRESSURE: 173 MMHG

## 2021-09-14 VITALS — SYSTOLIC BLOOD PRESSURE: 131 MMHG | DIASTOLIC BLOOD PRESSURE: 63 MMHG

## 2021-09-14 NOTE — NUR
HOSPITALIST TODAY INDICATED THAT THEY ARE MONITORING PT'S BP MEDS AND THAT
THEY ANTICIPATE DC TOMORROW. ANTICIPATE THAT PT WILL DC HOME TO SELF CARE ONCE
MEDICALLY STABLE.

## 2021-09-14 NOTE — NUR
ASSUMED PT CARE THIS AM. PT A&OX4, ABLE TO MAKE NEEDS KNOWN. PATIENT REPORTING
HAVING A HEADACHE, PAIN DECREASED ACCORDINGLY. PATIENT RREPORTING NO NUMBNESS
OR TINGLING. IV REMAINS PATENT. FALL PRECAUTIONS ARE IN PLACE. PATIENT REMAINS
CONTINENT, UP TO THE BATHROOM WITH ASSIST.

## 2021-09-14 NOTE — NUR
ASSUMED PT CARE THIS EVENING. PT IS ALERT AND ORIENTED X4. PT STILL HAS
CONCERN ABOUT ELEVATED BP AND WHY BP MEDS ARE NOT WORKING AS THEY SHOULD. PT
WAS INFORMED ABOUT INCREASE IN BP MEDS DOSAGE. PT C/O HEADACHE WHICH WAS
MANAGED BY PRN PAIN MEDS. PT TOOK A SHOWER AND RELAXED HAIR. BP /57; PT
WAS HAPPY ABOUT LOW BP. PT IS SBA TO THE BR. PT TOLEARTING RA. NO OTHER
CONCERNS WAS VERBALIZED. FALL PRECAUTIONS IN PLACE. WILL CONTINUE TO MONITOR.

## 2021-09-15 VITALS — DIASTOLIC BLOOD PRESSURE: 73 MMHG | SYSTOLIC BLOOD PRESSURE: 183 MMHG

## 2021-09-15 VITALS — SYSTOLIC BLOOD PRESSURE: 192 MMHG | DIASTOLIC BLOOD PRESSURE: 83 MMHG

## 2021-09-15 VITALS — DIASTOLIC BLOOD PRESSURE: 70 MMHG | SYSTOLIC BLOOD PRESSURE: 155 MMHG

## 2021-09-15 VITALS — SYSTOLIC BLOOD PRESSURE: 175 MMHG | DIASTOLIC BLOOD PRESSURE: 67 MMHG

## 2021-09-15 VITALS — DIASTOLIC BLOOD PRESSURE: 81 MMHG | SYSTOLIC BLOOD PRESSURE: 186 MMHG

## 2021-09-15 VITALS — DIASTOLIC BLOOD PRESSURE: 59 MMHG | SYSTOLIC BLOOD PRESSURE: 144 MMHG

## 2021-09-15 VITALS — DIASTOLIC BLOOD PRESSURE: 71 MMHG | SYSTOLIC BLOOD PRESSURE: 163 MMHG

## 2021-09-15 VITALS — SYSTOLIC BLOOD PRESSURE: 160 MMHG | DIASTOLIC BLOOD PRESSURE: 70 MMHG

## 2021-09-15 NOTE — NUR
RECEIVED CARE OF THIS PATIENT AT 1900.  PATIENT ALERT AND ORIENTED X4.
ANXIOUS ABOUT BP.  THIS AM /71.  PATIENT READY TO GO HOME. UP WITH
ASSIST OF ONE.  DENIES PAIN.  SLEPT MOST OF NIGHT.

## 2021-09-15 NOTE — NUR
TOOK ON PT CARE AT 0700. PT RESTED IN ROOM THROUGHOUT THE DAY WITH ONLY
CONCERNS OF HER HIGH BP. WAS CONTROLLED OVERNIGHT BUT MOISES RAPIDLY THROUGHOUT
THE DAY. CORRECTED WITH SCHEDULED MEDS AND PRN HYDRALAZINE X1. ALSO GAVE XANAX
X1 AND SEEMED TO ALLOW HER TO REST. PT BELIEVES THAT THIS COULD BE DUE TO A
PROCESS IN HER BRAIN. COMPLETED EDUCATION AND CONSOLED PT. NO OTHER CONCERNS
AT THIS TIME, MD ADDED ORAL HYDRALAZINE FOR FURTHER CONTROL OF BP.

## 2021-09-16 VITALS — SYSTOLIC BLOOD PRESSURE: 146 MMHG | DIASTOLIC BLOOD PRESSURE: 61 MMHG

## 2021-09-16 VITALS — SYSTOLIC BLOOD PRESSURE: 152 MMHG | DIASTOLIC BLOOD PRESSURE: 70 MMHG

## 2021-09-16 VITALS — DIASTOLIC BLOOD PRESSURE: 67 MMHG | SYSTOLIC BLOOD PRESSURE: 150 MMHG

## 2021-09-16 VITALS — SYSTOLIC BLOOD PRESSURE: 163 MMHG | DIASTOLIC BLOOD PRESSURE: 76 MMHG

## 2021-09-16 NOTE — NUR
CARE TEAM INDICATED THAT PT IS MEDICALLY STABLE TO DC HOME THIS DAY. PT TO DC
HOME TO SELF CARE. NO OTHER CM INTERVENTION INDICATED. CASE CLOSED.

## 2021-09-16 NOTE — NUR
ASSUMED PT CARE THIS AM. PT A&OX4, ABLE TO MAKE NEEDS KNOWN. PATIENT REPORTING
NO PAIN, NUMBNESS, OR TINGLING. PATIENT TOLERATING PO MEDICATIONS WITHOUT
ISSUE. PATIENT REMAINS ON TELE. IV SALINE LOCKED. FALL PRECAUTIONS ARE IN
PLACE. DISCHARGE TEACHING COMPLETED, PATIENT REFUSING TO LEAVE UNTIL
HOSPITALIST COMES TO TALK TO HER REGARDING HER MEDICATIONS SHE IS BEING SENT
HOME WITH. HOSPITALIST PAGED, AWAITING CALL BACK.

## 2021-09-16 NOTE — NUR
ASSUMED PT CARE THIS EVENING. PT IS ALERT AND ORIENT x4 . PT CONCERNED ABOUT
ELEVATED BP AND WANTS TO GO HOME. PT BECAME FORGETFUL IN THE MIDDLE OF THE
SHIFT. MEDS WERE GIVEN PER EMAR ORDERS.PT IS SBA TO THE BR. PT TOLERATING RA.
FALL PRECAUTIONS IN PLACE. WILL CONTINUE TO MONITOR.

## 2021-10-25 ENCOUNTER — HOSPITAL ENCOUNTER (OUTPATIENT)
Dept: HOSPITAL 35 - SJCVC | Age: 76
End: 2021-10-25
Attending: INTERNAL MEDICINE
Payer: COMMERCIAL

## 2021-10-25 DIAGNOSIS — Z88.8: ICD-10-CM

## 2021-10-25 DIAGNOSIS — F41.9: ICD-10-CM

## 2021-10-25 DIAGNOSIS — I10: Primary | ICD-10-CM

## 2021-10-25 DIAGNOSIS — E78.1: ICD-10-CM

## 2021-10-25 DIAGNOSIS — Z79.899: ICD-10-CM

## 2021-10-25 DIAGNOSIS — I51.7: ICD-10-CM

## 2021-10-25 DIAGNOSIS — Z79.82: ICD-10-CM

## 2021-10-25 DIAGNOSIS — E11.9: ICD-10-CM

## 2021-10-25 DIAGNOSIS — D86.9: ICD-10-CM

## 2022-02-22 ENCOUNTER — HOSPITAL ENCOUNTER (EMERGENCY)
Dept: HOSPITAL 35 - ER | Age: 77
Discharge: HOME | End: 2022-02-22
Payer: COMMERCIAL

## 2022-02-22 VITALS — SYSTOLIC BLOOD PRESSURE: 158 MMHG | DIASTOLIC BLOOD PRESSURE: 64 MMHG

## 2022-02-22 VITALS — WEIGHT: 175 LBS | BODY MASS INDEX: 27.47 KG/M2 | HEIGHT: 67 IN

## 2022-02-22 VITALS — DIASTOLIC BLOOD PRESSURE: 64 MMHG | SYSTOLIC BLOOD PRESSURE: 158 MMHG

## 2022-02-22 DIAGNOSIS — R07.89: Primary | ICD-10-CM

## 2022-02-22 DIAGNOSIS — F41.9: ICD-10-CM

## 2022-02-22 DIAGNOSIS — Z79.899: ICD-10-CM

## 2022-02-22 DIAGNOSIS — I10: ICD-10-CM

## 2022-02-22 DIAGNOSIS — J44.9: ICD-10-CM

## 2022-02-22 DIAGNOSIS — Z88.8: ICD-10-CM

## 2022-02-22 LAB
ANION GAP SERPL CALC-SCNC: 3 MMOL/L (ref 7–16)
BASOPHILS NFR BLD AUTO: 1.3 % (ref 0–2)
BUN SERPL-MCNC: 20 MG/DL (ref 7–18)
CALCIUM SERPL-MCNC: 9.3 MG/DL (ref 8.5–10.1)
CHLORIDE SERPL-SCNC: 105 MMOL/L (ref 98–107)
CO2 SERPL-SCNC: 29 MMOL/L (ref 21–32)
CREAT SERPL-MCNC: 1.5 MG/DL (ref 0.6–1)
EOSINOPHIL NFR BLD: 4.2 % (ref 0–3)
ERYTHROCYTE [DISTWIDTH] IN BLOOD BY AUTOMATED COUNT: 15.9 % (ref 10.5–14.5)
GLUCOSE SERPL-MCNC: 88 MG/DL (ref 74–106)
GRANULOCYTES NFR BLD MANUAL: 59.8 % (ref 36–66)
HCT VFR BLD CALC: 25.6 % (ref 37–47)
HGB BLD-MCNC: 8.7 GM/DL (ref 12–15)
LYMPHOCYTES NFR BLD AUTO: 27.6 % (ref 24–44)
MCH RBC QN AUTO: 26.7 PG (ref 26–34)
MCHC RBC AUTO-ENTMCNC: 33.7 G/DL (ref 28–37)
MCV RBC: 79.1 FL (ref 80–100)
MONOCYTES NFR BLD: 7.1 % (ref 1–8)
NEUTROPHILS # BLD: 2.8 THOU/UL (ref 1.4–8.2)
PLATELET # BLD: 239 THOU/UL (ref 150–400)
POTASSIUM SERPL-SCNC: 4 MMOL/L (ref 3.5–5.1)
RBC # BLD AUTO: 3.24 MIL/UL (ref 4.2–5)
SODIUM SERPL-SCNC: 137 MMOL/L (ref 136–145)
WBC # BLD AUTO: 4.7 THOU/UL (ref 4–11)

## 2022-02-22 NOTE — EKG
[place] [address] [facility] [address]
[place]  [address]
Phone:  [phone]                    ELECTROCARDIOGRAM REPORT      
_______________________________________________________________________________
 
Name:       DEEPTHI JEWELL               Room #:                     [facility]  
LUZ#:      [id number]     Account #:      [id number]  
Admission:  22    Attend Phys:                          
Discharge:  22    Date of Birth:  [birth year]  
                                                          Report #: [id number]
   [id number]
_______________________________________________________________________________
                         [facility] ED
                                       
Test Date:    2022               Test Time:    03:25:37
Pat Name:     DEEPTHI JEWELL            Department:   
Patient ID:   [id number]            Room:         170
Gender:       F                        Technician:   ANTHONY
:          1945               Requested By: Fredo Magallanes
Order Number: [id number] MD:   Yared Streeter
                                 Measurements
Intervals                              Axis          
Rate:         57                       P:            -18
FL:           260                      QRS:          -37
QRSD:         130                      T:            -43
QT:           438                                    
QTc:          427                                    
                           Interpretive Statements
Sinus rhythm
Prolonged FL interval
Nonspecific IVCD with LAD
Left ventricular hypertrophy
Nonspecific T abnormalities, inferior leads
Baseline wander in lead(s) V1
Compared to ECG 2021 05:01:12
Intraventricular conduction delay now present
Left ventricular hypertrophy now present
T-wave abnormality still present
Electronically Signed On 2022 7:40:05 CST by Yared Streeter
[email or web address]
 
 
 
 
 
 
 
 
 
 
 
 
 
 
 
  <ELECTRONICALLY SIGNED>
   By: Yared Streeter MD, FACC    
  22     0740
D: 22 0325                           _____________________________________
T: 22 0325                           Yared Streeter MD, [facility]      /EPI

## 2022-02-22 NOTE — NUR
PT WAS INFORMED THAT SHE WOULD HAVE TO BE COVID SWABBED FOR ADMISSION. PT THEN
REFUSED SWAB AND STATED THAT SHE WOULD NOT BE SWABBED, THAT SHE KNEW THAT SHE
DID NOT HAVE COVID, THAT SHE WAS FULL VACCINATED.
EDUCATED PT THAT SHE
WOULD HAVE TO BE SWABBED FOR ADMISSION, AND THAT WE HAD TO SWAB EVERY PT THAT
CAME INTO THE HOSPITAL. PT AGAIN REFUSED STATING, "THE LAST NURSE THAT DID THAT
, IN THIS HOSPITAL, WENT TOO FAR AND NOW MY NOSE BLEEDS. I HAD A HORRIBLE
REACTION." AGAIN TOLD PT THAT WE WOULD EITHER HAVE TO SWAB HER, OR SHE WOULD
HAVE TO LEAVE AMA. PT AGREED TO LEAVE AMA, PAPER SIGNED AND PHYSICAN NOIFIED.

## 2023-08-01 NOTE — NUR
LOV:4/18/2023  LRF:6/1/2023   PATIENT ADMIT TO UNIT AT 1610 FROM ER. A/O X4 . ON 2L/NC. ANXIOUS. GAIT
UNSTEADY. WILL KEEP MONITOR.
